# Patient Record
Sex: MALE | Race: WHITE | NOT HISPANIC OR LATINO | Employment: FULL TIME | URBAN - METROPOLITAN AREA
[De-identification: names, ages, dates, MRNs, and addresses within clinical notes are randomized per-mention and may not be internally consistent; named-entity substitution may affect disease eponyms.]

---

## 2017-09-10 ENCOUNTER — HOSPITAL ENCOUNTER (EMERGENCY)
Facility: CLINIC | Age: 52
Discharge: HOME | End: 2017-09-10
Attending: EMERGENCY MEDICINE | Admitting: EMERGENCY MEDICINE
Payer: COMMERCIAL

## 2017-09-10 VITALS — RESPIRATION RATE: 20 BRPM | HEIGHT: 74 IN | TEMPERATURE: 99.4 F | HEART RATE: 86 BPM | OXYGEN SATURATION: 94 %

## 2017-09-10 DIAGNOSIS — R50.9 FEVER, UNSPECIFIED: ICD-10-CM

## 2017-09-10 DIAGNOSIS — L03.116 CELLULITIS OF LEFT LOWER LEG: ICD-10-CM

## 2017-09-10 LAB
BASOPHILS # BLD AUTO: 0 10E9/L (ref 0–0.2)
BASOPHILS NFR BLD AUTO: 0.4 %
DIFFERENTIAL METHOD BLD: ABNORMAL
EOSINOPHIL # BLD AUTO: 0.2 10E9/L (ref 0–0.7)
EOSINOPHIL NFR BLD AUTO: 1.4 %
ERYTHROCYTE [DISTWIDTH] IN BLOOD BY AUTOMATED COUNT: 12.8 % (ref 10–15)
HCT VFR BLD AUTO: 40.1 % (ref 40–53)
HGB BLD-MCNC: 13.7 G/DL (ref 13.3–17.7)
IMM GRANULOCYTES # BLD: 0 10E9/L (ref 0–0.4)
IMM GRANULOCYTES NFR BLD: 0.4 %
LYMPHOCYTES # BLD AUTO: 1.3 10E9/L (ref 0.8–5.3)
LYMPHOCYTES NFR BLD AUTO: 12.2 %
MCH RBC QN AUTO: 28.9 PG (ref 26.5–33)
MCHC RBC AUTO-ENTMCNC: 34.2 G/DL (ref 31.5–36.5)
MCV RBC AUTO: 85 FL (ref 78–100)
MONOCYTES # BLD AUTO: 1 10E9/L (ref 0–1.3)
MONOCYTES NFR BLD AUTO: 8.6 %
NEUTROPHILS # BLD AUTO: 8.5 10E9/L (ref 1.6–8.3)
NEUTROPHILS NFR BLD AUTO: 77 %
NRBC # BLD AUTO: 0 10*3/UL
NRBC BLD AUTO-RTO: 0 /100
PLATELET # BLD AUTO: 204 10E9/L (ref 150–450)
RBC # BLD AUTO: 4.74 10E12/L (ref 4.4–5.9)
WBC # BLD AUTO: 11 10E9/L (ref 4–11)

## 2017-09-10 PROCEDURE — 85025 COMPLETE CBC W/AUTO DIFF WBC: CPT | Performed by: EMERGENCY MEDICINE

## 2017-09-10 PROCEDURE — 25000128 H RX IP 250 OP 636: Performed by: EMERGENCY MEDICINE

## 2017-09-10 PROCEDURE — 96365 THER/PROPH/DIAG IV INF INIT: CPT

## 2017-09-10 PROCEDURE — 99284 EMERGENCY DEPT VISIT MOD MDM: CPT | Mod: 25

## 2017-09-10 PROCEDURE — 87040 BLOOD CULTURE FOR BACTERIA: CPT | Mod: 91 | Performed by: EMERGENCY MEDICINE

## 2017-09-10 RX ORDER — LIDOCAINE 40 MG/G
CREAM TOPICAL
Status: DISCONTINUED | OUTPATIENT
Start: 2017-09-10 | End: 2017-09-10 | Stop reason: HOSPADM

## 2017-09-10 RX ORDER — CEFTRIAXONE 1 G/1
1 INJECTION, POWDER, FOR SOLUTION INTRAMUSCULAR; INTRAVENOUS ONCE
Status: COMPLETED | OUTPATIENT
Start: 2017-09-10 | End: 2017-09-10

## 2017-09-10 RX ADMIN — CEFTRIAXONE SODIUM 1 G: 1 INJECTION, POWDER, FOR SOLUTION INTRAMUSCULAR; INTRAVENOUS at 02:23

## 2017-09-10 ASSESSMENT — ENCOUNTER SYMPTOMS
WOUND: 0
FEVER: 1
COLOR CHANGE: 1

## 2017-09-10 NOTE — ED AVS SNAPSHOT
[facility]    [address]    [facility] [address]    Phone:  [phone]    Fax:  [phone]                                       Triston Rausch   MRN: [id number]    Department:  [facility]   Date of Visit:  9/10/2017           After Visit Summary Signature Page     I have received my discharge instructions, and my questions have been answered. I have discussed any challenges I see with this plan with the nurse or doctor.    ..........................................................................................................................................  Patient/Patient Representative Signature      ..........................................................................................................................................  Patient Representative Print Name and Relationship to Patient    ..................................................               ................................................  Date                                            Time    ..........................................................................................................................................  Reviewed by Signature/Title    ...................................................              ..............................................  Date                                                            Time

## 2017-09-10 NOTE — ED NOTES
Here with redness and swelling of the left lower leg but not the foot for about 24 hours. Fever of 100.3 today. Some nausea . Says he has no known abrasions to that area. Somewhat painful when he walks.

## 2017-09-10 NOTE — ED PROVIDER NOTES
"  History     Chief Complaint:  Leg Erythema      HPI   Triston Rausch is a 52 year old male who presents to the emergency department today for evaluation of left leg redness. The patient reports that he noticed redness and some mild swelling of his left lower leg on Friday, which gradually spread up his leg. He states that he has never had this redness before. He reports a temperature of 100.3 this afternoon and denies any recent cuts or scrapes, although he is noted to have a rash similar to tinea pedis between the toes of his left foot. He denies a known allergy to any antibiotics.    Allergies:  No Known Drug Allergies     Medications:    Diltiazem HCl  Metoprolol    Past Medical History:    Atrial fib/flutter, transient    Past Surgical History:    History reviewed. No pertinent surgical history.    Family History:    History reviewed. No pertinent family history.    Social History:  The patient was accompanied to the ED by his wife.  Marital Status:       Review of Systems   Constitutional: Positive for fever (subjective).   Musculoskeletal:        Positive for mild edema of the left lower leg.   Skin: Positive for color change (erythema of left anterior lower leg) and rash (flaking rash between second and third toe of left foot). Negative for wound.   All other systems reviewed and are negative.    Physical Exam     Vitals:  Patient Vitals for the past 24 hrs:   Temp Temp src Pulse Resp SpO2 Height   09/10/17 0038 99.4  F (37.4  C) Oral 86 20 94 % 1.88 m (6' 2\")     Physical Exam  Nursing note and vitals reviewed.  Constitutional: Cooperative.   HENT:   Mouth/Throat: Mucous membranes are normal.   Cardiovascular: Normal rate, regular rhythm and normal heart sounds.  No murmur.  Pulmonary/Chest: Effort normal and breath sounds normal. No respiratory distress. No wheezes. No rales.    Musculoskeletal: Normal range of motion of LLE, no edema.   Neurological: Alert.   Skin: Skin is warm and dry. Warm, " blanching erythema to the anterior left leg. Weeping rash between the second and third toe consistent with tinea pedis.  Psychiatric: Normal mood and affect.     Emergency Department Course     Laboratory:  Laboratory findings were communicated with the patient who voiced understanding of the findings.    CBC: WBC 11.0, HGB 13.7,     Blood culture x2: pending    Interventions:  0223 Ceftriaxone 1 g IV    Emergency Department Course:  Nursing notes and vitals reviewed.  I performed an exam of the patient as documented above.   IV was inserted and blood was drawn for laboratory testing, results above.  I discussed the treatment plan with the patient. They expressed understanding of this plan and consented to discharge. They will be discharged home with instructions for care and follow up. In addition, the patient will return to the emergency department if their symptoms persist, worsen, if new symptoms arise or if there is any concern.  All questions were answered.  I personally reviewed the laboratory results with the patient and answered all related questions prior to discharge.    Impression & Plan      Medical Decision Making:  Triston Rausch is a 52 year old male who presents for evaluation of erythema of the left lower leg.  The history, physical exam and supporting data are consistent with cellulitis.  There do not appear at this time to be any complication of cellulitis including necrotizing fascitis, lymphangitis, lymphadenitis, abscess, osteomyelitis, sepsis, or shock.  The patient is not immunosuppressed.  Supportive outpatient management is indicated with antibiotics.  Close follow-up of primary care physician to ensure no progression and rapid resolution.  Area of cellulitis was outlined.  Cellulitis precautions for home.      Diagnosis:    ICD-10-CM   1. Cellulitis of left lower leg L03.116   2. Fever R50.9     Disposition:  The patient is discharged to home.  Discharge Medications:  Discharge  Medication List as of 9/10/2017  2:57 AM      START taking these medications    Details   amoxicillin-clavulanate (AUGMENTIN) 875-125 MG per tablet Take 1 tablet by mouth 2 times daily for 7 days, Disp-14 tablet, R-0, Local Print           Scribe Disclosure:  NERY, Farooq Dotson, am serving as a scribe at 1:47 AM on 9/10/2017 to document services personally performed by Eddie Titus MD based on my observations and the provider's statements to me.  [facility] EMERGENCY DEPARTMENT       Eddie Titus MD  09/10/17 0615

## 2017-09-10 NOTE — DISCHARGE INSTRUCTIONS
Discharge Instructions  Cellulitis    Cellulitis is an infection of the skin that occurs when bacteria enter the skin.   Symptoms are generally redness, swelling, warmth and pain.  Your infection appeared to be appropriate to treat at home with antibiotics.  However, sometimes your infection may be worse than it seemed at first, or may worsen with time. If you have new or worse symptoms, you may need to be seen again in the Emergency Department or by your primary provider.    Generally, every Emergency Department visit should have a follow-up clinic visit with either a primary or a specialty clinic/provider. Please follow-up as instructed by your emergency provider today.    Return to the Emergency Department if:    The redness, pain, or swelling gets a lot worse.  If the red area was marked, return if it is red significantly beyond the marked area.    You are unable to get your antibiotics, or are vomiting (throwing up) these pills, or you cannot take them.    You are feeling more ill, weak or lightheaded.    You start to run a new fever (temperature >101 F).    Anything else about the infection worries or concerns you.  Treatment:    Start your antibiotics right away, and take them as prescribed. Be sure to finish the whole prescription, even if you are better.    Apply a heating pad, warm packs, or warm water soaks to the infected area for 15 minutes at a time, at least 3 times a day. Do not use a heating pad on your feet or legs if you have diabetes. Do not sleep with a heating pad on, since this can cause burns or skin injury.    Rest your injured area for at least 1-2 days. After that you may start using your extremity again as long as there is not too much pain.     Raise the injured area above the level of your heart as much as possible in the first 1-2 days.    Tylenol  (acetaminophen), Motrin  (ibuprofen), or Advil  (ibuprofen) may help may help reduce pain and fever and may help you feel more comfortable.  Be sure to read and follow the package directions, and ask your provider if you have questions.    If you were given a prescription for medicine here today, be sure to read all of the information (including the package insert) that comes with your prescription.  This will include important information about the medicine, its side effects, and any warnings that you need to know about.  The pharmacist who fills the prescription can provide more information and answer questions you may have about the medicine.  If you have questions or concerns that the pharmacist cannot address, please call or return to the Emergency Department.     Remember that you can always come back to the Emergency Department if you are not able to see your regular provider in the amount of time listed above, if you get any new symptoms, or if there is anything that worries you.

## 2017-09-10 NOTE — ED AVS SNAPSHOT
[facility] Emergency Department    [address]    [facility] [address]    Phone:  [phone]    Fax:  [phone]                                       Triston Rausch   MRN: [id number]    Department:  [facility] Emergency Department   Date of Visit:  9/10/2017           Patient Information     Date Of Birth          1965        Your diagnoses for this visit were:     Cellulitis of left lower leg     Fever, unspecified        You were seen by Eddie Titus MD.      Follow-up Information     Follow up with Clinic, [facility].    Why:  Monday    Contact information:    [address] [facility] [address]  [phone]          Discharge Instructions       Discharge Instructions  Cellulitis    Cellulitis is an infection of the skin that occurs when bacteria enter the skin.   Symptoms are generally redness, swelling, warmth and pain.  Your infection appeared to be appropriate to treat at home with antibiotics.  However, sometimes your infection may be worse than it seemed at first, or may worsen with time. If you have new or worse symptoms, you may need to be seen again in the Emergency Department or by your primary provider.    Generally, every Emergency Department visit should have a follow-up clinic visit with either a primary or a specialty clinic/provider. Please follow-up as instructed by your emergency provider today.    Return to the Emergency Department if:    The redness, pain, or swelling gets a lot worse.  If the red area was marked, return if it is red significantly beyond the marked area.    You are unable to get your antibiotics, or are vomiting (throwing up) these pills, or you cannot take them.    You are feeling more ill, weak or lightheaded.    You start to run a new fever (temperature >101 F).    Anything else about the infection worries or concerns you.  Treatment:    Start your antibiotics right away, and take them as  prescribed. Be sure to finish the whole prescription, even if you are better.    Apply a heating pad, warm packs, or warm water soaks to the infected area for 15 minutes at a time, at least 3 times a day. Do not use a heating pad on your feet or legs if you have diabetes. Do not sleep with a heating pad on, since this can cause burns or skin injury.    Rest your injured area for at least 1-2 days. After that you may start using your extremity again as long as there is not too much pain.     Raise the injured area above the level of your heart as much as possible in the first 1-2 days.    Tylenol  (acetaminophen), Motrin  (ibuprofen), or Advil  (ibuprofen) may help may help reduce pain and fever and may help you feel more comfortable. Be sure to read and follow the package directions, and ask your provider if you have questions.    If you were given a prescription for medicine here today, be sure to read all of the information (including the package insert) that comes with your prescription.  This will include important information about the medicine, its side effects, and any warnings that you need to know about.  The pharmacist who fills the prescription can provide more information and answer questions you may have about the medicine.  If you have questions or concerns that the pharmacist cannot address, please call or return to the Emergency Department.     Remember that you can always come back to the Emergency Department if you are not able to see your regular provider in the amount of time listed above, if you get any new symptoms, or if there is anything that worries you.      24 Hour Appointment Hotline       To make an appointment at any [facility], call [phone] ([phone]). If you don't have a family doctor or clinic, we will help you find one. [place] clinics are conveniently located to serve the needs of you and your family.             Review of your medicines      START taking         Dose / Directions Last dose taken    amoxicillin-clavulanate 875-125 MG per tablet   Commonly known as:  AUGMENTIN   Dose:  1 tablet   Quantity:  14 tablet        Take 1 tablet by mouth 2 times daily for 7 days   Refills:  0          Our records show that you are taking the medicines listed below. If these are incorrect, please call your family doctor or clinic.        Dose / Directions Last dose taken    DILTIAZEM HCL PO        Refills:  0        metoprolol 25 MG 24 hr tablet   Commonly known as:  TOPROL-XL   Dose:  25 mg   Quantity:  90 tablet        Take 1 tablet by mouth daily.   Refills:  3                Prescriptions were sent or printed at these locations (1 Prescription)                   Other Prescriptions                Printed at Department/Unit printer (1 of 1)         amoxicillin-clavulanate (AUGMENTIN) 875-125 MG per tablet                Procedures and tests performed during your visit     Procedure/Test Number of Times Performed    Blood culture 2    CBC with platelets differential 1      Orders Needing Specimen Collection     None      Pending Results     No orders found from 9/8/2017 to 9/11/2017.            Pending Culture Results     No orders found from 9/8/2017 to 9/11/2017.            Pending Results Instructions     If you had any lab results that were not finalized at the time of your Discharge, you can call the ED Lab Result RN at [phone]. You will be contacted by this team for any positive Lab results or changes in treatment. The nurses are available 7 days a week from 10A to 6:30P.  You can leave a message 24 hours per day and they will return your call.        Test Results From Your Hospital Stay        9/10/2017  2:31 AM      Component Results     Component Value Ref Range & Units Status    WBC 11.0 4.0 - 11.0 10e9/L Final    RBC Count 4.74 4.4 - 5.9 10e12/L Final    Hemoglobin 13.7 13.3 - 17.7 g/dL Final    Hematocrit 40.1 40.0 - 53.0 % Final    MCV 85 78 - 100 fl Final    MCH  28.9 26.5 - 33.0 pg Final    MCHC 34.2 31.5 - 36.5 g/dL Final    RDW 12.8 10.0 - 15.0 % Final    Platelet Count 204 150 - 450 10e9/L Final    Diff Method Automated Method  Final    % Neutrophils 77.0 % Final    % Lymphocytes 12.2 % Final    % Monocytes 8.6 % Final    % Eosinophils 1.4 % Final    % Basophils 0.4 % Final    % Immature Granulocytes 0.4 % Final    Nucleated RBCs 0 0 /100 Final    Absolute Neutrophil 8.5 (H) 1.6 - 8.3 10e9/L Final    Absolute Lymphocytes 1.3 0.8 - 5.3 10e9/L Final    Absolute Monocytes 1.0 0.0 - 1.3 10e9/L Final    Absolute Eosinophils 0.2 0.0 - 0.7 10e9/L Final    Absolute Basophils 0.0 0.0 - 0.2 10e9/L Final    Abs Immature Granulocytes 0.0 0 - 0.4 10e9/L Final    Absolute Nucleated RBC 0.0  Final                Clinical Quality Measure: Blood Pressure Screening     Your blood pressure was checked while you were in the emergency department today. The last reading we obtained was    . Please read the guidelines below about what these numbers mean and what you should do about them.  If your systolic blood pressure (the top number) is less than 120 and your diastolic blood pressure (the bottom number) is less than 80, then your blood pressure is normal. There is nothing more that you need to do about it.  If your systolic blood pressure (the top number) is 120-139 or your diastolic blood pressure (the bottom number) is 80-89, your blood pressure may be higher than it should be. You should have your blood pressure rechecked within a year by a primary care provider.  If your systolic blood pressure (the top number) is 140 or greater or your diastolic blood pressure (the bottom number) is 90 or greater, you may have high blood pressure. High blood pressure is treatable, but if left untreated over time it can put you at risk for heart attack, stroke, or kidney failure. You should have your blood pressure rechecked by a primary care provider within the next 4 weeks.  If your provider in the  "emergency department today gave you specific instructions to follow-up with your doctor or provider even sooner than that, you should follow that instruction and not wait for up to 4 weeks for your follow-up visit.        Thank you for choosing [facility]       Thank you for choosing [facility] for your care. Our goal is always to provide you with excellent care. Hearing back from our patients is one way we can continue to improve our services. Please take a few minutes to complete the written survey that you may receive in the mail after you visit with us. Thank you!        [email or web address] Information     [employer] lets you send messages to your doctor, view your test results, renew your prescriptions, schedule appointments and more. To sign up, go to [email or web address] . Click on \"Log in\" on the left side of the screen, which will take you to the Welcome page. Then click on \"Sign up Now\" on the right side of the page.     You will be asked to enter the access code listed below, as well as some personal information. Please follow the directions to create your username and password.     Your access code is: [email or web address]  Expires: 2017  2:41 AM     Your access code will  in 90 days. If you need help or a new code, please call your [facility] clinic or [phone].        Care EveryWhere ID     This is your Care EveryWhere ID. This could be used by other organizations to access your [facility] medical records  [id number]        Equal Access to Services     LUIS ANTONIO PHILIP : Hadii mike Mosquera, waaxda lunandiniadaha, qaybta kaalmada cora, mateusz garces . So [facility] [phone].    ATENCIÓN: Si habla español, tiene a muñiz disposición servicios gratuitos de asistencia lingüística. Llame al [phone].    We comply with applicable federal civil rights laws and [place] laws. We do not discriminate on the basis of race, color, national origin, age, disability sex, sexual orientation or " gender identity.            After Visit Summary       This is your record. Keep this with you and show to your community pharmacist(s) and doctor(s) at your next visit.

## 2017-09-16 LAB
BACTERIA SPEC CULT: NO GROWTH
BACTERIA SPEC CULT: NO GROWTH
Lab: NORMAL
Lab: NORMAL
SPECIMEN SOURCE: NORMAL
SPECIMEN SOURCE: NORMAL

## 2018-11-24 ENCOUNTER — HOSPITAL ENCOUNTER (EMERGENCY)
Facility: CLINIC | Age: 53
Discharge: HOME | End: 2018-11-24
Attending: EMERGENCY MEDICINE | Admitting: EMERGENCY MEDICINE
Payer: COMMERCIAL

## 2018-11-24 VITALS
HEIGHT: 75 IN | DIASTOLIC BLOOD PRESSURE: 66 MMHG | RESPIRATION RATE: 22 BRPM | BODY MASS INDEX: 39.17 KG/M2 | SYSTOLIC BLOOD PRESSURE: 124 MMHG | OXYGEN SATURATION: 94 % | TEMPERATURE: 98 F | WEIGHT: 315 LBS

## 2018-11-24 DIAGNOSIS — I48.91 ATRIAL FIBRILLATION STATUS POST CARDIOVERSION (H): ICD-10-CM

## 2018-11-24 DIAGNOSIS — Z01.89 ENCOUNTER FOR CARDIOVERSION PROCEDURE: ICD-10-CM

## 2018-11-24 DIAGNOSIS — I48.0 PAROXYSMAL ATRIAL FIBRILLATION (H): ICD-10-CM

## 2018-11-24 LAB
ANION GAP SERPL CALCULATED.3IONS-SCNC: 8 MMOL/L (ref 3–14)
BASOPHILS # BLD AUTO: 0 10E9/L (ref 0–0.2)
BASOPHILS NFR BLD AUTO: 0.6 %
BUN SERPL-MCNC: 17 MG/DL (ref 7–30)
CALCIUM SERPL-MCNC: 8.6 MG/DL (ref 8.5–10.1)
CHLORIDE SERPL-SCNC: 106 MMOL/L (ref 94–109)
CO2 SERPL-SCNC: 26 MMOL/L (ref 20–32)
CREAT SERPL-MCNC: 0.84 MG/DL (ref 0.66–1.25)
DIFFERENTIAL METHOD BLD: NORMAL
EOSINOPHIL # BLD AUTO: 0.3 10E9/L (ref 0–0.7)
EOSINOPHIL NFR BLD AUTO: 4.1 %
ERYTHROCYTE [DISTWIDTH] IN BLOOD BY AUTOMATED COUNT: 12.5 % (ref 10–15)
GFR SERPL CREATININE-BSD FRML MDRD: >90 ML/MIN/1.7M2
GLUCOSE SERPL-MCNC: 104 MG/DL (ref 70–99)
HCT VFR BLD AUTO: 46.4 % (ref 40–53)
HGB BLD-MCNC: 15.7 G/DL (ref 13.3–17.7)
IMM GRANULOCYTES # BLD: 0 10E9/L (ref 0–0.4)
IMM GRANULOCYTES NFR BLD: 0.3 %
LYMPHOCYTES # BLD AUTO: 1.7 10E9/L (ref 0.8–5.3)
LYMPHOCYTES NFR BLD AUTO: 24.8 %
MAGNESIUM SERPL-MCNC: 2.2 MG/DL (ref 1.6–2.3)
MCH RBC QN AUTO: 29.2 PG (ref 26.5–33)
MCHC RBC AUTO-ENTMCNC: 33.8 G/DL (ref 31.5–36.5)
MCV RBC AUTO: 86 FL (ref 78–100)
MONOCYTES # BLD AUTO: 0.6 10E9/L (ref 0–1.3)
MONOCYTES NFR BLD AUTO: 8.3 %
NEUTROPHILS # BLD AUTO: 4.3 10E9/L (ref 1.6–8.3)
NEUTROPHILS NFR BLD AUTO: 61.9 %
NRBC # BLD AUTO: 0 10*3/UL
NRBC BLD AUTO-RTO: 0 /100
NT-PROBNP SERPL-MCNC: 667 PG/ML (ref 0–900)
PLATELET # BLD AUTO: 230 10E9/L (ref 150–450)
POTASSIUM SERPL-SCNC: 3.8 MMOL/L (ref 3.4–5.3)
RBC # BLD AUTO: 5.38 10E12/L (ref 4.4–5.9)
SODIUM SERPL-SCNC: 140 MMOL/L (ref 133–144)
T4 FREE SERPL-MCNC: 1.11 NG/DL (ref 0.76–1.46)
TROPONIN I SERPL-MCNC: <0.015 UG/L (ref 0–0.04)
TSH SERPL DL<=0.005 MIU/L-ACNC: 7.31 MU/L (ref 0.4–4)
WBC # BLD AUTO: 6.9 10E9/L (ref 4–11)

## 2018-11-24 PROCEDURE — 27211117 ZZH CARDIOVERT/DEFIB/PACER SUPP

## 2018-11-24 PROCEDURE — 96361 HYDRATE IV INFUSION ADD-ON: CPT

## 2018-11-24 PROCEDURE — 99285 EMERGENCY DEPT VISIT HI MDM: CPT | Mod: 25

## 2018-11-24 PROCEDURE — 83735 ASSAY OF MAGNESIUM: CPT | Performed by: EMERGENCY MEDICINE

## 2018-11-24 PROCEDURE — 92960 CARDIOVERSION ELECTRIC EXT: CPT

## 2018-11-24 PROCEDURE — 85025 COMPLETE CBC W/AUTO DIFF WBC: CPT | Performed by: EMERGENCY MEDICINE

## 2018-11-24 PROCEDURE — 25000128 H RX IP 250 OP 636: Performed by: EMERGENCY MEDICINE

## 2018-11-24 PROCEDURE — 80048 BASIC METABOLIC PNL TOTAL CA: CPT | Performed by: EMERGENCY MEDICINE

## 2018-11-24 PROCEDURE — 84439 ASSAY OF FREE THYROXINE: CPT | Performed by: EMERGENCY MEDICINE

## 2018-11-24 PROCEDURE — 96360 HYDRATION IV INFUSION INIT: CPT

## 2018-11-24 PROCEDURE — 84484 ASSAY OF TROPONIN QUANT: CPT | Performed by: EMERGENCY MEDICINE

## 2018-11-24 PROCEDURE — 96374 THER/PROPH/DIAG INJ IV PUSH: CPT

## 2018-11-24 PROCEDURE — 84443 ASSAY THYROID STIM HORMONE: CPT | Performed by: EMERGENCY MEDICINE

## 2018-11-24 PROCEDURE — 93005 ELECTROCARDIOGRAM TRACING: CPT

## 2018-11-24 PROCEDURE — 83880 ASSAY OF NATRIURETIC PEPTIDE: CPT | Performed by: EMERGENCY MEDICINE

## 2018-11-24 RX ORDER — SODIUM CHLORIDE 9 MG/ML
INJECTION, SOLUTION INTRAVENOUS CONTINUOUS
Status: DISCONTINUED | OUTPATIENT
Start: 2018-11-24 | End: 2018-11-24 | Stop reason: HOSPADM

## 2018-11-24 RX ORDER — PROPOFOL 10 MG/ML
1 INJECTION, EMULSION INTRAVENOUS ONCE
Status: COMPLETED | OUTPATIENT
Start: 2018-11-24 | End: 2018-11-24

## 2018-11-24 RX ADMIN — PROPOFOL 180 MG: 10 INJECTION, EMULSION INTRAVENOUS at 11:24

## 2018-11-24 RX ADMIN — SODIUM CHLORIDE: 9 INJECTION, SOLUTION INTRAVENOUS at 09:51

## 2018-11-24 ASSESSMENT — ENCOUNTER SYMPTOMS
VOMITING: 0
PALPITATIONS: 1
LIGHT-HEADEDNESS: 0
ABDOMINAL PAIN: 0
HEADACHES: 0
COUGH: 0
CHILLS: 0
FATIGUE: 1
FEVER: 0
SHORTNESS OF BREATH: 1

## 2018-11-24 NOTE — ED AVS SNAPSHOT
[facility] Emergency Department    [address]    [facility] [address]    Phone:  [phone]    Fax:  [phone]                                       Triston Rausch   MRN: [id number]    Department:  [facility] Emergency Department   Date of Visit:  11/24/2018           Patient Information     Date Of Birth          1965        Your diagnoses for this visit were:     Paroxysmal atrial fibrillation (H)     Atrial fibrillation status post cardioversion (H)     Encounter for cardioversion procedure        You were seen by Ela Garrido MD.      Follow-up Information     Follow up with [facility], [facility] [place] In 1 week.    Contact information:    [address] [facility] [address]  [phone]          Follow up with Cardiology. Schedule an appointment as soon as possible for a visit in 2 days.        Follow up with [facility] Emergency Department.    Specialty:  EMERGENCY MEDICINE    Why:  If symptoms worsen    Contact information:    [address] [facility] [address] [phone]        Discharge Instructions       Call cardiology first thing Monday.  Return immediately with return of symptoms or any new concerns.    Discharge References/Attachments     RECOVERY AFTER CONSCIOUS SEDATION (ADULT) (ENGLISH)    ATRIAL FIBRILLATION, DISCHARGE INSTRUCTIONS FOR (ENGLISH)      24 Hour Appointment Hotline       To make an appointment at any [place] clinic, call [phone] ([phone]). If you don't have a family doctor or clinic, we will help you find one. [place] clinics are conveniently located to serve the needs of you and your family.             Review of your medicines      Our records show that you are taking the medicines listed below. If these are incorrect, please call your family doctor or clinic.        Dose / Directions Last dose taken    DILTIAZEM HCL PO   Dose:  360 mg        Take 360 mg by mouth    Refills:  0        metoprolol succinate 25 MG 24 hr tablet   Commonly known as:  TOPROL-XL   Dose:  25 mg   Quantity:  90 tablet        Take 1 tablet by mouth daily.   Refills:  3                Procedures and tests performed during your visit     Procedure/Test Number of Times Performed    Basic metabolic panel 1    CBC with platelets differential 1    Cardiac Continuous Monitoring 1    EKG 12 lead 2    Magnesium 1    Nt probnp inpatient (BNP) 1    Peripheral IV catheter 1    Pulse oximetry nursing 1    T4 free 2    TSH with free T4 reflex 1    Troponin I 1      Orders Needing Specimen Collection     None      Pending Results     Date and Time Order Name Status Description    11/24/2018 1126 EKG 12 lead Preliminary     11/24/2018 0946 EKG 12 lead Preliminary     11/24/2018 0942 T4 free In process             Pending Culture Results     No orders found from 11/22/2018 to 11/25/2018.            Pending Results Instructions     If you had any lab results that were not finalized at the time of your Discharge, you can call the ED Lab Result RN at [phone]. You will be contacted by this team for any positive Lab results or changes in treatment. The nurses are available 7 days a week from 10A to 6:30P.  You can leave a message 24 hours per day and they will return your call.        Test Results From Your Hospital Stay        11/24/2018 10:12 AM      Component Results     Component Value Ref Range & Units Status    N-Terminal Pro BNP Inpatient 667 0 - 900 pg/mL Final       Reference range shown and results flagged as abnormal are suggested inpatient   cut points for confirming diagnosis if CHF in an acute setting. Establishing a   baseline value for each individual patient is useful for follow-up. An   inpatient or emergency department NT-proPBNP <300 pg/mL effectively rules out   acute CHF, with 99% negative predictive value.  The outpatient non-acute reference range for ruling out CHF is:   0-125 pg/mL (age 18 to  less than 75)   0-450 pg/mL (age 75 yrs and older)           11/24/2018 10:30 AM      Component Results     Component Value Ref Range & Units Status    TSH 7.31 (H) 0.40 - 4.00 mU/L Final         11/24/2018 10:12 AM      Component Results     Component Value Ref Range & Units Status    Troponin I ES <0.015 0.000 - 0.045 ug/L Final    The 99th percentile for upper reference range is 0.045 ug/L.  Troponin values   in the range of 0.045 - 0.120 ug/L may be associated with risks of adverse   clinical events.           11/24/2018 10:12 AM      Component Results     Component Value Ref Range & Units Status    Sodium 140 133 - 144 mmol/L Final    Potassium 3.8 3.4 - 5.3 mmol/L Final    Chloride 106 94 - 109 mmol/L Final    Carbon Dioxide 26 20 - 32 mmol/L Final    Anion Gap 8 3 - 14 mmol/L Final    Glucose 104 (H) 70 - 99 mg/dL Final    Urea Nitrogen 17 7 - 30 mg/dL Final    Creatinine 0.84 0.66 - 1.25 mg/dL Final    GFR Estimate >90 >60 mL/min/1.7m2 Final    Non  GFR Calc    GFR Estimate If Black >90 >60 mL/min/1.7m2 Final    African American GFR Calc    Calcium 8.6 8.5 - 10.1 mg/dL Final         11/24/2018  9:53 AM      Component Results     Component Value Ref Range & Units Status    WBC 6.9 4.0 - 11.0 10e9/L Final    RBC Count 5.38 4.4 - 5.9 10e12/L Final    Hemoglobin 15.7 13.3 - 17.7 g/dL Final    Hematocrit 46.4 40.0 - 53.0 % Final    MCV 86 78 - 100 fl Final    MCH 29.2 26.5 - 33.0 pg Final    MCHC 33.8 31.5 - 36.5 g/dL Final    RDW 12.5 10.0 - 15.0 % Final    Platelet Count 230 150 - 450 10e9/L Final    Diff Method Automated Method  Final    % Neutrophils 61.9 % Final    % Lymphocytes 24.8 % Final    % Monocytes 8.3 % Final    % Eosinophils 4.1 % Final    % Basophils 0.6 % Final    % Immature Granulocytes 0.3 % Final    Nucleated RBCs 0 0 /100 Final    Absolute Neutrophil 4.3 1.6 - 8.3 10e9/L Final    Absolute Lymphocytes 1.7 0.8 - 5.3 10e9/L Final    Absolute Monocytes 0.6 0.0 - 1.3 10e9/L Final     Absolute Eosinophils 0.3 0.0 - 0.7 10e9/L Final    Absolute Basophils 0.0 0.0 - 0.2 10e9/L Final    Abs Immature Granulocytes 0.0 0 - 0.4 10e9/L Final    Absolute Nucleated RBC 0.0  Final         11/24/2018 10:12 AM      Component Results     Component Value Ref Range & Units Status    Magnesium 2.2 1.6 - 2.3 mg/dL Final         11/24/2018 10:17 AM         11/24/2018 10:30 AM      Component Results     Component Value Ref Range & Units Status    T4 Free 1.11 0.76 - 1.46 ng/dL Final                Clinical Quality Measure: Blood Pressure Screening     Your blood pressure was checked while you were in the emergency department today. The last reading we obtained was  BP: 124/66 . Please read the guidelines below about what these numbers mean and what you should do about them.  If your systolic blood pressure (the top number) is less than 120 and your diastolic blood pressure (the bottom number) is less than 80, then your blood pressure is normal. There is nothing more that you need to do about it.  If your systolic blood pressure (the top number) is 120-139 or your diastolic blood pressure (the bottom number) is 80-89, your blood pressure may be higher than it should be. You should have your blood pressure rechecked within a year by a primary care provider.  If your systolic blood pressure (the top number) is 140 or greater or your diastolic blood pressure (the bottom number) is 90 or greater, you may have high blood pressure. High blood pressure is treatable, but if left untreated over time it can put you at risk for heart attack, stroke, or kidney failure. You should have your blood pressure rechecked by a primary care provider within the next 4 weeks.  If your provider in the emergency department today gave you specific instructions to follow-up with your doctor or provider even sooner than that, you should follow that instruction and not wait for up to 4 weeks for your follow-up visit.        Thank you for  "choosing [facility]       Thank you for choosing [facility] for your care. Our goal is always to provide you with excellent care. Hearing back from our patients is one way we can continue to improve our services. Please take a few minutes to complete the written survey that you may receive in the mail after you visit with us. Thank you!        [employer] Information     [employer] lets you send messages to your doctor, view your test results, renew your prescriptions, schedule appointments and more. To sign up, go to [email or web address] . Click on \"Log in\" on the left side of the screen, which will take you to the Welcome page. Then click on \"Sign up Now\" on the right side of the page.     You will be asked to enter the access code listed below, as well as some personal information. Please follow the directions to create your username and password.     Your access code is: [email or web address]  Expires: 2019 10:23 AM     Your access code will  in 90 days. If you need help or a new code, please call your [facility] clinic or [phone].        Care EveryWhere ID     This is your Care EveryWhere ID. This could be used by other organizations to access your [facility] medical records  [id number]        Equal Access to Services     LUIS ANTONIO PHILIP : Abigail gomeso Demetri, wacarineda lunandiniadaha, qaybta kaalmada adesonia, mateusz davis. So [facility] [phone].    ATENCIÓN: Si habla español, tiene a muñiz disposición servicios gratuitos de asistencia lingüística. Llame al [phone].    We comply with applicable federal civil rights laws and [place] laws. We do not discriminate on the basis of race, color, national origin, age, disability, sex, sexual orientation, or gender identity.            After Visit Summary       This is your record. Keep this with you and show to your community pharmacist(s) and doctor(s) at your next visit.                  "

## 2018-11-24 NOTE — ED PROVIDER NOTES
"  History     Chief Complaint:  Afib     The history is provided by the patient.      Triston Rausch is a 53 year old male with history of atrial fibrillation on Cardizem who presents with concern for atrial fibrillation. The patient reports that around midnight last night (9.5 hours prior to arrival), he felt like he \"flipped into atrial fibrillation\" with sudden onset of palpitations, nausea, fatigue, and shortness of breath similar to his past episodes of atrial fibrillation. In the past, patient has had multiple electrical cardioversions. He had an ablation last year and has had only 1 episode of atrial fibrillation until today since that time.  The patient denies chest pain or abdominal pain. He denies recent illness. He reports he may have missed 1 dose of his diltiazem recently, which is unusual for him, but he has had a \"very rough week\" at work. His wife remarks that patient's atrial fibrillation episodes typically occur at times of high stress (e.g. twice on the day of a ).     Allergies:  No known drug allergies      Medications:    Diltiazem    Past Medical History:    Atrial fibrillation s/p ablation  Kidney stones     Past Surgical History:    History reviewed. No pertinent surgical history.     Family History:    History reviewed. No pertinent family history.      Social History:  Presents with wife  Marital Status:    Smoking status: never  Alcohol use: No      Review of Systems   Constitutional: Positive for fatigue. Negative for chills and fever.   Respiratory: Positive for shortness of breath. Negative for cough.    Cardiovascular: Positive for palpitations. Negative for chest pain and leg swelling.   Gastrointestinal: Negative for abdominal pain and vomiting.   Neurological: Negative for light-headedness and headaches.   All other systems reviewed and are negative.       Physical Exam     Patient Vitals for the past 24 hrs:   BP Temp Temp src Heart Rate Resp SpO2 Height Weight " "  11/24/18 1145 124/66 - - 73 - 94 % - -   11/24/18 1108 - - - - 22 97 % - -   11/24/18 1045 128/86 - - 89 - - - -   11/24/18 1030 (!) 133/99 - - 81 - 96 % - -   11/24/18 0939 - 98  F (36.7  C) Oral - - - - -   11/24/18 0935 (!) 139/96 - - 84 - 96 % 1.905 m (6' 3\") (!) 158.8 kg (350 lb)      Physical Exam  General: Well-developed and well-nourished. Well appearing middle aged  man. Cooperative.  Head:  Atraumatic.  Eyes:  Conjunctivae, lids, and sclerae are normal.  ENT:    Normal nose. Moist mucous membranes.  Neck:  Supple. Normal range of motion.  CV:  Regular rate and irregularly irregular rhythm. Normal heart sounds with no murmurs, rubs, or gallops detected.  Resp:  No respiratory distress. Clear to auscultation bilaterally without decreased breath sounds, wheezing, rales, or rhonchi.  GI:  Soft. Non-distended. Non-tender.    MS:  Normal ROM. No bilateral lower extremity edema.  Skin:  Warm. Non-diaphoretic. No pallor.  Neuro: Awake. A&Ox3. Normal strength.  Psych:  Normal mood and affect. Normal speech.  Vitals reviewed.    Emergency Department Course   #1 EKG  Time: 0931  Rate 93 bpm. QRS duration 104. QT/QTc 362/450.   Atrial fibrillation  Incomplete right bundle branch block   No acute ST changes.  No change as compared to prior, dated 11/6/12.    #2 EKG  Time: 1119  Rate 78 bpm. SD interval 192. QRS duration 106. QT/QTc 362/450.   Normal sinus rhythm   Incomplete right bundle branch block  Borderline ECG  No acute ST changes.   Sinus rhythm replaces atrial fibrillation compared to prior dated today, 11/24/2018.    Laboratory:  BNP: 667    TSH: 7.31(H)    Troponin I: <0.015    BMP: glucose 104(H), o/w WNL (creatinine 0.84)    CBC: WNL (WBC 6.9, HGB 15.7, )     Magnesium: 2.2    (1030) T4 free: 1.11  T4 free: In process      Procedures:  [facility] Procedure Note        Sedation:      Performed by: Ela Garrido MD  Authorized by: Ela Garrido MD    Pre-Procedure Assessment " done at 1100.    Expected Level:  Moderate Sedation    Indication:  Sedation is required to allow for Cardioversion    Consent obtained from patient after discussing the risks, benefits and alternatives.    PO Intake:  Appropriately NPO for procedure    ASA Class:  Class 2 - MILD SYSTEMIC DISEASE, NO ACUTE PROBLEMS, NO FUNCTIONAL LIMITATIONS.    Mallampati:  Grade 1:  Soft palate, uvula, tonsillar pillars, and posterior pharyngeal wall visible    Lungs: Lungs Clear with good breath sounds bilaterally.     Heart: Normal heart sounds and rate    History and physical reviewed and no updates needed. I have reviewed the lab findings, diagnostic data, medications, and the plan for sedation. I have determined this patient to be an appropriate candidate for the planned sedation and procedure and have reassessed the patient IMMEDIATELY PRIOR to sedation and procedure.    Electrical Cardioversion:    Indications: symptomatic atrial fibrillation    Consent:  Risks, benefits and alternatives were discussed with the patient consent for procedure was obtained.    Timeout:  Universal protocol was followed. TIME OUT conducted just prior to starting procedure confirmed patient identity, site/side, procedure, patient position, and availability of correct equipment and implants.? Yes    Medication: Propofol     Procedure note:  Quick combo electrodes were placed in anterior-posterior positions  Trial 1: Synchronized shock at 200 Joules was successful.     Patient Status:   Patient tolerated the procedure well. There were no complications.      Sedation Post Procedure Summary:    Prior to the start of the procedure and with procedural staff participation, I verbally confirmed the patient s identity using two indicators, relevant allergies, that the procedure was appropriate and matched the consent or emergent situation, and that the correct equipment/implants were available. Immediately prior to starting the procedure I conducted the  "Time Out with the procedural staff and re-confirmed the patient s name, procedure, and site/side. (The Joint Commission universal protocol was followed.)  Yes      Sedatives: Propofol    Vital signs, airway, End Tidal CO2 and pulse oximetry were monitored and remained stable throughout the procedure and sedation was maintained until the procedure was complete.  The patient was monitored by staff until sedation discharge criteria were met.    Patient tolerance: Patient tolerated the procedure well with no immediate complications.    Time of sedation in minutes:  10 minutes from beginning to end of physician one to one monitoring.      Interventions:   1124: Diprivan 180 mg, IV      Emergency Department Course:  Past medical records, nursing notes, and vitals reviewed.  0938: I performed an exam of the patient and obtained history, as documented above.  IV inserted and blood drawn.     Cardioversion performed as above.    1221: I rechecked the patient. Explained findings to the patient.    I rechecked the patient. Findings and plan explained to the Patient. Patient discharged home with instructions regarding supportive care, medications, and reasons to return. The importance of close follow-up was reviewed.         VPIUM-1-Jekg Score  (calculator)  Background  Calculates overall risk of atrial fibrillation related CVA based on 7 factors: Age>=65-75, CHF, Htn, CVA/TIA, DM, vascular disease, female  Data  53 year old year old male   Atrial fibrillation  Criteria   Of possible 6 points for 5 possible items  NEGATIVE    Interpretation  MFOIK-9-Egmd Score: 0  CHADS Score 0: Aspirin 81 to 325 mg daily    Impression & Plan      Medical Decision Making:  Triston is a 53-year-old man who has a history of atrial fibrillation with history of ablation and multiple cardioversions in the past.  He presents as he felt himself \"flip in\" to atrial fibrillation with symptoms of fatigue, shortness of breath, palpitations without chest " pain about 10 hours ago.  This is exactly the same as his other episodes of atrial fibrillation and he presents requesting cardioversion.  EKG confirms atrial fibrillation in a rate controlled manner.  His workup is unremarkable including normal BNP and no evidence of volume overload on exam.  He has a negative troponin and normal electrolytes including magnesium. There is no evidence of thyroid dysfunction with a normal free T4 and no anemia or leukocytosis.  Thus, given patient's history of atrial fibrillation with known time of onset of his symptoms much < 48 hours, as well as his request for cardioversion, patient was consented for electrical cardioversion.  After we discussed risks and benefits he elected to proceed.  He underwent electrical cardioversion as above with propofol for sedation which he tolerated quite well.  We successfully cardioverted to normal sinus rhythm as confirmed by repeat EKG.  Patient recovered from sedation and was able to tolerate PO and ambulate without difficulty.  He states he is feeling quite well with no persistent symptoms that initially prompted his evaluation and felt comfortable with plan for discharge. At this time, I do not believe patient requires anticoagulation given his atrial fibrillation is quite paroxysmal and he is symptomatic with that so he will be able to come in should he have a recurrence. SGHKD-1-Pyob 0. However, I recommended he call his cardiologist first thing on Monday to discuss his recurrent atrial fibrillation and visit to the emergency department requiring cardioversion.  I have provided strict return precautions and answered all the patient's questions.  He verbalized understanding is amenable to discharge.    Diagnosis:    ICD-10-CM   1. Paroxysmal atrial fibrillation (H) I48.0   2. Atrial fibrillation status post cardioversion (H) I48.91   3. Encounter for cardioversion procedure Z01.89       Disposition:  discharged home    Discharge Medications:  No new discharge medications    Megan Beh  11/24/2018   [facility] EMERGENCY DEPARTMENT  I, Megan Beh, am serving as a scribe at 9:38 AM on 11/24/2018 to document services personally performed by Ela Garrido MD based on my observations and the provider's statements to me.       Ela Garrido MD  11/28/18 6380

## 2018-11-24 NOTE — DISCHARGE INSTRUCTIONS
Call cardiology first thing Monday.  Return immediately with return of symptoms or any new concerns.

## 2018-11-24 NOTE — ED AVS SNAPSHOT
[facility] Emergency Department    [address]    [facility] [address]    Phone:  [phone]    Fax:  [phone]                                       Triston Rausch   MRN: [id number]    Department:  [facility] Emergency Department   Date of Visit:  11/24/2018           After Visit Summary Signature Page     I have received my discharge instructions, and my questions have been answered. I have discussed any challenges I see with this plan with the nurse or doctor.    ..........................................................................................................................................  Patient/Patient Representative Signature      ..........................................................................................................................................  Patient Representative Print Name and Relationship to Patient    ..................................................               ................................................  Date                                   Time    ..........................................................................................................................................  Reviewed by Signature/Title    ...................................................              ..............................................  Date                                               Time          22EPIC Rev 08/18

## 2018-11-24 NOTE — ED NOTES
Pt ambulated in hallway independently, denies dizziness N/V or lightheadedness. Pt able to tolerate PO fluids.

## 2018-11-24 NOTE — ED TRIAGE NOTES
Pt reports around midnight feels like he flipped into A. Fib, feels like he had heart palpitations, SOB, nausea. Last time being cardioverted for A. Fib was this past summer. Had cardiac ablation October 2017. Pt A&Ox4.

## 2018-11-25 LAB
INTERPRETATION ECG - MUSE: NORMAL
INTERPRETATION ECG - MUSE: NORMAL

## 2019-06-11 ENCOUNTER — HOSPITAL ENCOUNTER (INPATIENT)
Facility: CLINIC | Age: 54
LOS: 5 days | Discharge: HOME | End: 2019-06-16
Attending: EMERGENCY MEDICINE | Admitting: INTERNAL MEDICINE
Payer: COMMERCIAL

## 2019-06-11 ENCOUNTER — APPOINTMENT (OUTPATIENT)
Dept: CT IMAGING | Facility: CLINIC | Age: 54
End: 2019-06-11
Attending: EMERGENCY MEDICINE
Payer: COMMERCIAL

## 2019-06-11 DIAGNOSIS — R11.2 NAUSEA VOMITING AND DIARRHEA: ICD-10-CM

## 2019-06-11 DIAGNOSIS — K56.609 SBO (SMALL BOWEL OBSTRUCTION) (H): ICD-10-CM

## 2019-06-11 DIAGNOSIS — R19.7 NAUSEA VOMITING AND DIARRHEA: ICD-10-CM

## 2019-06-11 LAB
ALBUMIN SERPL-MCNC: 3.8 G/DL (ref 3.4–5)
ALP SERPL-CCNC: 130 U/L (ref 40–150)
ALT SERPL W P-5'-P-CCNC: 34 U/L (ref 0–70)
ANION GAP SERPL CALCULATED.3IONS-SCNC: 6 MMOL/L (ref 3–14)
AST SERPL W P-5'-P-CCNC: 25 U/L (ref 0–45)
BASOPHILS # BLD AUTO: 0 10E9/L (ref 0–0.2)
BASOPHILS NFR BLD AUTO: 0.2 %
BILIRUB SERPL-MCNC: 0.7 MG/DL (ref 0.2–1.3)
BUN SERPL-MCNC: 18 MG/DL (ref 7–30)
CALCIUM SERPL-MCNC: 8.2 MG/DL (ref 8.5–10.1)
CHLORIDE SERPL-SCNC: 103 MMOL/L (ref 94–109)
CO2 SERPL-SCNC: 26 MMOL/L (ref 20–32)
CREAT SERPL-MCNC: 1.12 MG/DL (ref 0.66–1.25)
DIFFERENTIAL METHOD BLD: NORMAL
EOSINOPHIL # BLD AUTO: 0.1 10E9/L (ref 0–0.7)
EOSINOPHIL NFR BLD AUTO: 1.4 %
ERYTHROCYTE [DISTWIDTH] IN BLOOD BY AUTOMATED COUNT: 12.9 % (ref 10–15)
GFR SERPL CREATININE-BSD FRML MDRD: 75 ML/MIN/{1.73_M2}
GLUCOSE SERPL-MCNC: 115 MG/DL (ref 70–99)
HCT VFR BLD AUTO: 46.3 % (ref 40–53)
HGB BLD-MCNC: 15.4 G/DL (ref 13.3–17.7)
IMM GRANULOCYTES # BLD: 0 10E9/L (ref 0–0.4)
IMM GRANULOCYTES NFR BLD: 0.2 %
LACTATE BLD-SCNC: 1.2 MMOL/L (ref 0.7–2)
LIPASE SERPL-CCNC: 252 U/L (ref 73–393)
LYMPHOCYTES # BLD AUTO: 1.2 10E9/L (ref 0.8–5.3)
LYMPHOCYTES NFR BLD AUTO: 13.8 %
MCH RBC QN AUTO: 28.5 PG (ref 26.5–33)
MCHC RBC AUTO-ENTMCNC: 33.3 G/DL (ref 31.5–36.5)
MCV RBC AUTO: 86 FL (ref 78–100)
MONOCYTES # BLD AUTO: 0.9 10E9/L (ref 0–1.3)
MONOCYTES NFR BLD AUTO: 10.6 %
NEUTROPHILS # BLD AUTO: 6.2 10E9/L (ref 1.6–8.3)
NEUTROPHILS NFR BLD AUTO: 73.8 %
NRBC # BLD AUTO: 0 10*3/UL
NRBC BLD AUTO-RTO: 0 /100
PLATELET # BLD AUTO: 215 10E9/L (ref 150–450)
POTASSIUM SERPL-SCNC: 3.4 MMOL/L (ref 3.4–5.3)
PROT SERPL-MCNC: 8.4 G/DL (ref 6.8–8.8)
RBC # BLD AUTO: 5.41 10E12/L (ref 4.4–5.9)
SODIUM SERPL-SCNC: 135 MMOL/L (ref 133–144)
WBC # BLD AUTO: 8.4 10E9/L (ref 4–11)

## 2019-06-11 PROCEDURE — 25800030 ZZH RX IP 258 OP 636: Performed by: PHYSICIAN ASSISTANT

## 2019-06-11 PROCEDURE — 96376 TX/PRO/DX INJ SAME DRUG ADON: CPT

## 2019-06-11 PROCEDURE — 80053 COMPREHEN METABOLIC PANEL: CPT | Performed by: EMERGENCY MEDICINE

## 2019-06-11 PROCEDURE — 25800030 ZZH RX IP 258 OP 636: Performed by: EMERGENCY MEDICINE

## 2019-06-11 PROCEDURE — 25000128 H RX IP 250 OP 636: Performed by: EMERGENCY MEDICINE

## 2019-06-11 PROCEDURE — 99221 1ST HOSP IP/OBS SF/LOW 40: CPT | Performed by: SURGERY

## 2019-06-11 PROCEDURE — 96374 THER/PROPH/DIAG INJ IV PUSH: CPT

## 2019-06-11 PROCEDURE — 83690 ASSAY OF LIPASE: CPT | Performed by: EMERGENCY MEDICINE

## 2019-06-11 PROCEDURE — 85025 COMPLETE CBC W/AUTO DIFF WBC: CPT | Performed by: EMERGENCY MEDICINE

## 2019-06-11 PROCEDURE — 25000128 H RX IP 250 OP 636: Performed by: PHYSICIAN ASSISTANT

## 2019-06-11 PROCEDURE — 83605 ASSAY OF LACTIC ACID: CPT | Performed by: INTERNAL MEDICINE

## 2019-06-11 PROCEDURE — 74177 CT ABD & PELVIS W/CONTRAST: CPT

## 2019-06-11 PROCEDURE — 12000000 ZZH R&B MED SURG/OB

## 2019-06-11 PROCEDURE — 96361 HYDRATE IV INFUSION ADD-ON: CPT

## 2019-06-11 PROCEDURE — C9113 INJ PANTOPRAZOLE SODIUM, VIA: HCPCS | Performed by: PHYSICIAN ASSISTANT

## 2019-06-11 PROCEDURE — 99223 1ST HOSP IP/OBS HIGH 75: CPT | Mod: AI | Performed by: PHYSICIAN ASSISTANT

## 2019-06-11 PROCEDURE — 25000125 ZZHC RX 250: Performed by: INTERNAL MEDICINE

## 2019-06-11 PROCEDURE — 36415 COLL VENOUS BLD VENIPUNCTURE: CPT | Performed by: INTERNAL MEDICINE

## 2019-06-11 PROCEDURE — 96375 TX/PRO/DX INJ NEW DRUG ADDON: CPT

## 2019-06-11 PROCEDURE — 99285 EMERGENCY DEPT VISIT HI MDM: CPT | Mod: 25

## 2019-06-11 RX ORDER — ONDANSETRON 2 MG/ML
4 INJECTION INTRAMUSCULAR; INTRAVENOUS EVERY 6 HOURS PRN
Status: DISCONTINUED | OUTPATIENT
Start: 2019-06-11 | End: 2019-06-16 | Stop reason: HOSPADM

## 2019-06-11 RX ORDER — PANTOPRAZOLE SODIUM 40 MG/10ML
40 INJECTION, POWDER, LYOPHILIZED, FOR SOLUTION INTRAVENOUS EVERY 24 HOURS
Status: DISCONTINUED | OUTPATIENT
Start: 2019-06-11 | End: 2019-06-11

## 2019-06-11 RX ORDER — ACETAMINOPHEN 500 MG
500 TABLET ORAL EVERY 6 HOURS PRN
COMMUNITY

## 2019-06-11 RX ORDER — KETOROLAC TROMETHAMINE 15 MG/ML
15 INJECTION, SOLUTION INTRAMUSCULAR; INTRAVENOUS ONCE
Status: COMPLETED | OUTPATIENT
Start: 2019-06-11 | End: 2019-06-11

## 2019-06-11 RX ORDER — PROCHLORPERAZINE 25 MG
25 SUPPOSITORY, RECTAL RECTAL EVERY 12 HOURS PRN
Status: DISCONTINUED | OUTPATIENT
Start: 2019-06-11 | End: 2019-06-16 | Stop reason: HOSPADM

## 2019-06-11 RX ORDER — HYDROMORPHONE HYDROCHLORIDE 1 MG/ML
.3-.5 INJECTION, SOLUTION INTRAMUSCULAR; INTRAVENOUS; SUBCUTANEOUS
Status: DISCONTINUED | OUTPATIENT
Start: 2019-06-11 | End: 2019-06-16 | Stop reason: HOSPADM

## 2019-06-11 RX ORDER — IOPAMIDOL 755 MG/ML
500 INJECTION, SOLUTION INTRAVASCULAR ONCE
Status: COMPLETED | OUTPATIENT
Start: 2019-06-11 | End: 2019-06-11

## 2019-06-11 RX ORDER — ONDANSETRON 2 MG/ML
4 INJECTION INTRAMUSCULAR; INTRAVENOUS EVERY 30 MIN PRN
Status: DISCONTINUED | OUTPATIENT
Start: 2019-06-11 | End: 2019-06-11

## 2019-06-11 RX ORDER — NALOXONE HYDROCHLORIDE 0.4 MG/ML
.1-.4 INJECTION, SOLUTION INTRAMUSCULAR; INTRAVENOUS; SUBCUTANEOUS
Status: DISCONTINUED | OUTPATIENT
Start: 2019-06-11 | End: 2019-06-16 | Stop reason: HOSPADM

## 2019-06-11 RX ORDER — SODIUM CHLORIDE 9 MG/ML
INJECTION, SOLUTION INTRAVENOUS CONTINUOUS
Status: DISCONTINUED | OUTPATIENT
Start: 2019-06-11 | End: 2019-06-16 | Stop reason: HOSPADM

## 2019-06-11 RX ORDER — ACETAMINOPHEN 325 MG/1
650 TABLET ORAL EVERY 4 HOURS PRN
Status: DISCONTINUED | OUTPATIENT
Start: 2019-06-11 | End: 2019-06-16 | Stop reason: HOSPADM

## 2019-06-11 RX ORDER — POLYETHYLENE GLYCOL 3350 17 G/17G
17 POWDER, FOR SOLUTION ORAL DAILY PRN
Status: DISCONTINUED | OUTPATIENT
Start: 2019-06-11 | End: 2019-06-16 | Stop reason: HOSPADM

## 2019-06-11 RX ORDER — LOSARTAN POTASSIUM 25 MG/1
25 TABLET ORAL DAILY
COMMUNITY

## 2019-06-11 RX ORDER — METOPROLOL TARTRATE 1 MG/ML
5 INJECTION, SOLUTION INTRAVENOUS EVERY 6 HOURS
Status: DISCONTINUED | OUTPATIENT
Start: 2019-06-11 | End: 2019-06-12

## 2019-06-11 RX ORDER — PROCHLORPERAZINE MALEATE 10 MG
10 TABLET ORAL EVERY 6 HOURS PRN
Status: DISCONTINUED | OUTPATIENT
Start: 2019-06-11 | End: 2019-06-16 | Stop reason: HOSPADM

## 2019-06-11 RX ORDER — ONDANSETRON 4 MG/1
4 TABLET, ORALLY DISINTEGRATING ORAL EVERY 6 HOURS PRN
Status: DISCONTINUED | OUTPATIENT
Start: 2019-06-11 | End: 2019-06-16 | Stop reason: HOSPADM

## 2019-06-11 RX ORDER — OXYCODONE HYDROCHLORIDE 5 MG/1
5-10 TABLET ORAL
Status: DISCONTINUED | OUTPATIENT
Start: 2019-06-11 | End: 2019-06-12

## 2019-06-11 RX ORDER — HYDROMORPHONE HYDROCHLORIDE 1 MG/ML
0.5 INJECTION, SOLUTION INTRAMUSCULAR; INTRAVENOUS; SUBCUTANEOUS EVERY 30 MIN PRN
Status: DISCONTINUED | OUTPATIENT
Start: 2019-06-11 | End: 2019-06-11

## 2019-06-11 RX ADMIN — HYDROMORPHONE HYDROCHLORIDE 0.5 MG: 1 INJECTION, SOLUTION INTRAMUSCULAR; INTRAVENOUS; SUBCUTANEOUS at 16:17

## 2019-06-11 RX ADMIN — HYDROMORPHONE HYDROCHLORIDE 0.5 MG: 1 INJECTION, SOLUTION INTRAMUSCULAR; INTRAVENOUS; SUBCUTANEOUS at 11:10

## 2019-06-11 RX ADMIN — ONDANSETRON 4 MG: 2 INJECTION INTRAMUSCULAR; INTRAVENOUS at 11:10

## 2019-06-11 RX ADMIN — HYDROMORPHONE HYDROCHLORIDE 0.5 MG: 1 INJECTION, SOLUTION INTRAMUSCULAR; INTRAVENOUS; SUBCUTANEOUS at 13:53

## 2019-06-11 RX ADMIN — SODIUM CHLORIDE 1000 ML: 9 INJECTION, SOLUTION INTRAVENOUS at 05:50

## 2019-06-11 RX ADMIN — IOPAMIDOL 100 ML: 755 INJECTION, SOLUTION INTRAVENOUS at 06:18

## 2019-06-11 RX ADMIN — KETOROLAC TROMETHAMINE 15 MG: 15 INJECTION, SOLUTION INTRAMUSCULAR; INTRAVENOUS at 05:54

## 2019-06-11 RX ADMIN — METOPROLOL TARTRATE 5 MG: 1 INJECTION, SOLUTION INTRAVENOUS at 15:38

## 2019-06-11 RX ADMIN — HYDROMORPHONE HYDROCHLORIDE 0.5 MG: 1 INJECTION, SOLUTION INTRAMUSCULAR; INTRAVENOUS; SUBCUTANEOUS at 22:17

## 2019-06-11 RX ADMIN — ONDANSETRON 4 MG: 2 INJECTION INTRAMUSCULAR; INTRAVENOUS at 05:51

## 2019-06-11 RX ADMIN — HYDROMORPHONE HYDROCHLORIDE 0.5 MG: 1 INJECTION, SOLUTION INTRAMUSCULAR; INTRAVENOUS; SUBCUTANEOUS at 07:36

## 2019-06-11 RX ADMIN — SODIUM CHLORIDE: 9 INJECTION, SOLUTION INTRAVENOUS at 08:42

## 2019-06-11 RX ADMIN — PROCHLORPERAZINE EDISYLATE 10 MG: 5 INJECTION INTRAMUSCULAR; INTRAVENOUS at 13:53

## 2019-06-11 RX ADMIN — METOPROLOL TARTRATE 5 MG: 1 INJECTION, SOLUTION INTRAVENOUS at 21:38

## 2019-06-11 RX ADMIN — SODIUM CHLORIDE 65 ML: 9 INJECTION, SOLUTION INTRAVENOUS at 06:18

## 2019-06-11 RX ADMIN — SODIUM CHLORIDE, POTASSIUM CHLORIDE, SODIUM LACTATE AND CALCIUM CHLORIDE 1000 ML: 600; 310; 30; 20 INJECTION, SOLUTION INTRAVENOUS at 07:41

## 2019-06-11 RX ADMIN — PANTOPRAZOLE SODIUM 40 MG: 40 INJECTION, POWDER, FOR SOLUTION INTRAVENOUS at 11:10

## 2019-06-11 RX ADMIN — ONDANSETRON 4 MG: 2 INJECTION INTRAMUSCULAR; INTRAVENOUS at 07:35

## 2019-06-11 ASSESSMENT — ACTIVITIES OF DAILY LIVING (ADL)
ADLS_ACUITY_SCORE: 10
ADLS_ACUITY_SCORE: 10
ADLS_ACUITY_SCORE: 12

## 2019-06-11 ASSESSMENT — ENCOUNTER SYMPTOMS
ABDOMINAL DISTENTION: 1
NAUSEA: 1
ABDOMINAL PAIN: 1
DIARRHEA: 1
FEVER: 0

## 2019-06-11 NOTE — PLAN OF CARE
Pt is here for abd. Pain and possible SBO. NG tube attempted twice, pt could not tolerate, gag reflux and choking sensation from pt.  VSS Pain 8/10 IV dilaudid given. Pt complaining of NV, IV zofran given. Pt is on IV Protonix. Surgery consult placed. Pt currently NPO. Up IND. Plan is to monitor pain and bowel sounds. Will continue to monitor.

## 2019-06-11 NOTE — PHARMACY-ADMISSION MEDICATION HISTORY
Admission medication history interview status for this patient is complete. See [facility] admission navigator for allergy information, prior to admission medications and immunization status.     Medication history interview source(s):Patient  Medication history resources (including written lists, pill bottles, clinic record): [facility]  Primary pharmacy:[facility]    Changes made to [facility] medication list:  Added: tylenol, losartan 25mg  Deleted: metoprolol (last filled 5 yrs ago)  Changed: None    Actions taken by pharmacist (provider contacted, etc):None     Additional medication history information: [facility] pharmacy    Medication reconciliation/reorder completed by provider prior to medication history? No    Do you take OTC medications (eg tylenol, ibuprofen, fish oil, eye/ear drops, etc)? Yes    Prior to Admission medications    Medication Sig Last Dose Taking? Auth Provider   acetaminophen (TYLENOL) 500 MG tablet Take 500 mg by mouth every 6 hours as needed for mild pain Past Week at Unknown time Yes Unknown, Entered By History   DILTIAZEM HCL PO Take 360 mg by mouth  6/10/2019 at PM Yes Reported, Patient   losartan (COZAAR) 25 MG tablet Take 25 mg by mouth daily 6/10/2019 at Unknown time Yes Unknown, Entered By History

## 2019-06-11 NOTE — CONSULTS
[facility] Surgery Consultation    Triston Rausch MRN# [id number]   Age: 54 year old YOB: 1965     Date of Admission:  6/11/2019    Reason for consult: Abdominal pain       Requesting physician: Malena       Level of consult: Consult, follow and place orders           Assessment and Plan:   Assessment:   Abdominal pain, history suggestive of viral illness - possible ileus  Patient Active Problem List    Diagnosis Date Noted     SBO (small bowel obstruction) (H) 06/11/2019     Priority: Medium     Umbilical and inguinal hernias without bowel involvement      Plan:   Abdominal films as needed to follow progress  Laparotomy/ laparoscopy if fails to resolve  Pt requests NG to see if it helps with nausea and discomfort  Will add PPI            Chief Complaint:   Abdominal pain     History is obtained from the patient and electronic health record         History of Present Illness:   This patient is a 54 year old  male with a significant past medical history of SONG, Afib, CKI, HTN, hernia repair and kidney stones who presents with the following condition requiring a hospital admission: abdominal pain. CT shows proximal dilated and distal decompressed bowel but no transition point on CT. Both he and his wife have had nausea,vomiting and diarrhea for 3 days. He then felt better but developed abdominal pain again yesterday so came to ER          Past Medical History:     Past Medical History:   Diagnosis Date     Atrial fib/flutter, transient      Chronic kidney disease      Hypertension              Past Surgical History:     Past Surgical History:   Procedure Laterality Date     GENITOURINARY SURGERY               Social History:     Social History     Tobacco Use     Smoking status: Never Smoker     Smokeless tobacco: Never Used   Substance Use Topics     Alcohol use: No             Family History:   No family history on file.          Immunizations:     VACCINE/DOSE   Diptheria   DPT  "  DTAP   HBIG   Hepatitis A   Hepatitis B   HIB   Influenza   Measles   Meningococcal   MMR   Mumps   Pneumococcal   Polio   Rubella   Small Pox   TDAP   Varicella   Zoster             Allergies:   No Known Allergies          Medications:     Current Facility-Administered Medications   Medication     acetaminophen (TYLENOL) tablet 650 mg     HYDROmorphone (PF) (DILAUDID) injection 0.3-0.5 mg     melatonin tablet 1 mg     naloxone (NARCAN) injection 0.1-0.4 mg     ondansetron (ZOFRAN-ODT) ODT tab 4 mg    Or     ondansetron (ZOFRAN) injection 4 mg     oxyCODONE (ROXICODONE) tablet 5-10 mg     pantoprazole (PROTONIX) 40 mg IV push injection     polyethylene glycol (MIRALAX/GLYCOLAX) Packet 17 g     prochlorperazine (COMPAZINE) injection 10 mg    Or     prochlorperazine (COMPAZINE) tablet 10 mg    Or     prochlorperazine (COMPAZINE) Suppository 25 mg     sodium chloride 0.9% infusion             Review of Systems:   CV: NEGATIVE for chest pain, palpitations or peripheral edema  C: NEGATIVE for fever, chills, change in weight  E/M: NEGATIVE for ear, mouth and throat problems  R: NEGATIVE for significant cough or SOB          Physical Exam:   All vitals have been reviewed  Patient Vitals for the past 24 hrs:   BP Temp Temp src Pulse Heart Rate Resp SpO2 Height Weight   06/11/19 0815 122/56 98  F (36.7  C) Oral -- 71 20 93 % 1.905 m (6' 3\") --   06/11/19 0800 -- -- -- 70 -- -- 92 % -- --   06/11/19 0745 99/54 -- -- -- -- -- 92 % -- --   06/11/19 0730 -- -- -- -- -- -- 94 % -- --   06/11/19 0715 110/65 -- -- -- -- -- 91 % -- --   06/11/19 0705 -- -- -- -- -- -- 93 % -- --   06/11/19 0700 136/62 -- -- 74 -- -- 92 % -- --   06/11/19 0650 -- -- -- -- -- -- 94 % -- --   06/11/19 0645 136/65 -- -- 74 -- -- 94 % -- --   06/11/19 0534 124/82 97  F (36.1  C) -- -- 80 18 93 % -- (!) 158.8 kg (350 lb)     No intake or output data in the 24 hours ending 06/11/19 1308    Sclera: clear    Neck:   skin normal and no stridor     Chest / " Breast:   Nl resp effort     Abdomen:   , firm, rounded and obese,distended, mild tenderness noted diffusely, voluntary guarding absent, no masses palpated and hernia present at umbilicus and non-tender             Data:   All laboratory data reviewed  Results for orders placed or performed during the hospital encounter of 06/11/19   Abd/pelvis CT,  IV  contrast only TRAUMA / AAA    Narrative    CT ABDOMEN PELVIS W CONTRAST   6/11/2019 6:22 AM     HISTORY: Abdominal pain, distension, vomiting.    TECHNIQUE:  CT abdomen and pelvis with 100mL Isovue-370 IV. Radiation  dose for this scan was reduced using automated exposure control,  adjustment of the mA and/or kV according to patient size, or iterative  reconstruction technique.    COMPARISON: 8/30/2007.    FINDINGS:  Abdomen: There is atelectasis and scarring at the lung bases. The  heart size is normal. The liver, spleen, gallbladder, pancreas and  adrenal glands are normal in appearance. There are a few small  low-attenuation renal cortical lesions bilaterally which are likely  cysts. There are two small stones in the lower pole of the left kidney  and a single small stone in the lower pole of the right kidney. No  ureteral stone or hydronephrosis. There is no abdominal or pelvic  lymph node enlargement. Multiple subcentimeter retroperitoneal and  mesenteric lymph nodes.    Pelvis: There are multiple dilated proximal small bowel loops. Distal  small bowel and colon are nondilated. Transition point appears to be  in the mid abdomen. No cause of obstruction is evident. There is mild  mesenteric edema associated with dilated small bowel loops. No free  intraperitoneal gas or fluid. A few pelvic phleboliths.      Impression    IMPRESSION:   1. Mid small bowel obstruction.  2. Bilateral renal stones. No ureteral stone or hydronephrosis.    ARTIE GAYTAN MD   CBC with platelets differential   Result Value Ref Range    WBC 8.4 4.0 - 11.0 10e9/L    RBC Count 5.41 4.4 -  5.9 10e12/L    Hemoglobin 15.4 13.3 - 17.7 g/dL    Hematocrit 46.3 40.0 - 53.0 %    MCV 86 78 - 100 fl    MCH 28.5 26.5 - 33.0 pg    MCHC 33.3 31.5 - 36.5 g/dL    RDW 12.9 10.0 - 15.0 %    Platelet Count 215 150 - 450 10e9/L    Diff Method Automated Method     % Neutrophils 73.8 %    % Lymphocytes 13.8 %    % Monocytes 10.6 %    % Eosinophils 1.4 %    % Basophils 0.2 %    % Immature Granulocytes 0.2 %    Nucleated RBCs 0 0 /100    Absolute Neutrophil 6.2 1.6 - 8.3 10e9/L    Absolute Lymphocytes 1.2 0.8 - 5.3 10e9/L    Absolute Monocytes 0.9 0.0 - 1.3 10e9/L    Absolute Eosinophils 0.1 0.0 - 0.7 10e9/L    Absolute Basophils 0.0 0.0 - 0.2 10e9/L    Abs Immature Granulocytes 0.0 0 - 0.4 10e9/L    Absolute Nucleated RBC 0.0    Comprehensive metabolic panel   Result Value Ref Range    Sodium 135 133 - 144 mmol/L    Potassium 3.4 3.4 - 5.3 mmol/L    Chloride 103 94 - 109 mmol/L    Carbon Dioxide 26 20 - 32 mmol/L    Anion Gap 6 3 - 14 mmol/L    Glucose 115 (H) 70 - 99 mg/dL    Urea Nitrogen 18 7 - 30 mg/dL    Creatinine 1.12 0.66 - 1.25 mg/dL    GFR Estimate 75 >60 mL/min/[1.73_m2]    GFR Estimate If Black 87 >60 mL/min/[1.73_m2]    Calcium 8.2 (L) 8.5 - 10.1 mg/dL    Bilirubin Total 0.7 0.2 - 1.3 mg/dL    Albumin 3.8 3.4 - 5.0 g/dL    Protein Total 8.4 6.8 - 8.8 g/dL    Alkaline Phosphatase 130 40 - 150 U/L    ALT 34 0 - 70 U/L    AST 25 0 - 45 U/L   Lipase   Result Value Ref Range    Lipase 252 73 - 393 U/L        Attestation:  I have reviewed today's vital signs, notes, medications, labs and imaging.  Amount of time performed on this consult: 45 minutes.    Isac Godoy MD

## 2019-06-11 NOTE — ED TRIAGE NOTES
"Nausea and vomiting and diarrhea for 2 days, now woke up feeling\"bloated\" and having abd pain,    Alert and oriented ABC intact  "

## 2019-06-11 NOTE — PROVIDER NOTIFICATION
MD paged pt in 353 JK, attempted twice for the NG tube, pt could not tolerate. Gag reflux and pt felt like he was being choked.

## 2019-06-11 NOTE — ED PROVIDER NOTES
History     Chief Complaint:  Abdominal Pain    HPI:   The history is provided by the patient.      Triston Rausch is a 54 year old male with history of transient atrial fib/flutter, hyperlipidemia, and hypertension who presents with abdominal pain. The patient states that 3 days ago, both he and his wife were sick with diarrhea and vomiting. The patient states that yesterday he felt improved, but last night he had a recurrence of diarrhea and emesis. This morning, the patient states he woke up with increased abdominal pain and abdominal distention, prompting his evaluation. No black or bloody stool.     Allergies:  No known drug allergies      Medications:    Diltiazem   Metoprolol     Past Medical History:    Atrial fib/flutter transient  CKD  Hypertension  Hyperlipidemia   SONG    Past Surgical History:    Hernia repair    surgery    Family History:    Hypertension: father, sister  Atrial fib: sister  High cholesterol: sister    Social History:  PCP: [facility]   Marital Status:    Smoking status: never  Alcohol use: Negative       Review of Systems   Constitutional: Negative for fever.   Cardiovascular: Negative for chest pain.   Gastrointestinal: Positive for abdominal distention, abdominal pain, diarrhea and nausea.   Skin: Negative for rash.   All other systems reviewed and are negative.    Physical Exam     Patient Vitals for the past 24 hrs:   BP Temp Heart Rate Resp SpO2 Weight   06/11/19 0534 124/82 97  F (36.1  C) 80 18 93 % (!) 158.8 kg (350 lb)        Physical Exam  Gen: well appearing, in no acute distress  HEENT:  mmm, no rhinorrhea  Neck: supple, no abnormal swelling  Lungs:  CTAB,  no resp distress  CV: rrr, no m/r/g, ppi  Abd: soft, mild generalized ttp, moderate distension, no rebound or guarding  Ext: no peripheral edema  Skin: warm, dry, well perfused, no rashes/bruising/lesions on exposed skin  Neuro: alert, MAEE, no gross motor or sensory deficits, gait  stable  Psych: Normal mood, normal affect      Emergency Department Course     Imaging:  Radiographic findings were communicated with the patient who voiced understanding of the findings.    Abd/pelvis CT, IV contrast only trauma/AAA  Impression:   Preliminary result by Rojas Navarro MD (06/11/19 06:34:32)                Impression:    IMPRESSION:   1. Mid small bowel obstruction.  2. Bilateral renal stones. No ureteral stone or hydronephrosis.            Narrative:    CT ABDOMEN PELVIS W CONTRAST   6/11/2019 6:22 AM     HISTORY: Abdominal pain, distension, vomiting.    TECHNIQUE:  CT abdomen and pelvis with 100mL Isovue-370 IV. Radiation  dose for this scan was reduced using automated exposure control,  adjustment of the mA and/or kV according to patient size, or iterative  reconstruction technique.    COMPARISON: 8/30/2007.    FINDINGS:  Abdomen: There is atelectasis and scarring at the lung bases. The  heart size is normal. The liver, spleen, gallbladder, pancreas and  adrenal glands are normal in appearance. There are a few small  low-attenuation renal cortical lesions bilaterally which are likely  cysts. There are two small stones in the lower pole of the left kidney  and a single small stone in the lower pole of the right kidney. No  ureteral stone or hydronephrosis. There is no abdominal or pelvic  lymph node enlargement. Multiple subcentimeter retroperitoneal and  mesenteric lymph nodes.    Pelvis: There are multiple dilated proximal small bowel loops. Distal  small bowel and colon are nondilated. Transition point appears to be  in the mid abdomen. No cause of obstruction is evident. There is mild  mesenteric edema associated with dilated small bowel loops. No free  intraperitoneal gas or fluid. A few pelvic phleboliths.                    As read by Radiology.     Laboratory:  CBC: WNL (WBC 8.4, HGB 15.4, )   CMP: glucose 115, calcium 8.2, o/w WNL (Creatinine 1.12)  Lipase:  252    Procedures:          Interventions:  Medications   ondansetron (ZOFRAN) injection 4 mg (4 mg Intravenous Given 19 0551)   0.9% sodium chloride BOLUS (1,000 mLs Intravenous New Bag 19 0550)   lactated ringers BOLUS 1,000 mL (has no administration in time range)   HYDROmorphone (PF) (DILAUDID) injection 0.5 mg (has no administration in time range)   ketorolac (TORADOL) injection 15 mg (15 mg Intravenous Given 19 0554)   iopamidol (ISOVUE-370) solution 500 mL (100 mLs Intravenous Given 19 0618)   0.9% sodium chloride BOLUS (0 mLs Intravenous Stopped 19)        Emergency Department Course:  Past medical records, nursing notes, and vitals reviewed.  0545: I performed an exam of the patient and obtained history, as documented above.  IV started and blood drawn for laboratory testing. Results are as above.      The patient was sent for a CT imaging while in the emergency department, findings above.         Impression & Plan      CMS Diagnoses:         Medical Decision Makin 4-year-old male with a remote history of a right inguinal hernia repair here with abdominal pain vomiting and diarrhea.  Family members had similar GI symptoms over the weekend but his has persisted since now with new abdominal distention and worsening of symptoms.  Found to have a small bowel obstruction here in the emergency room.  Blood tests are unremarkable hemodynamics are stable plan will be admission for hospitalist service to a medical bed surgical consultation if not improving as expected with conservative management.      Critical Care time:  none    Diagnosis:    ICD-10-CM    1. SBO (small bowel obstruction) (H) K56.609    2. Nausea vomiting and diarrhea R11.2     R19.7        Disposition:  Admitted to Medical Bed    Discharge Medications:     Medication List      There are no discharge medications for this visit.       I, Megan Beh, am serving as a scribe at 5:45 AM on 2019 to document services  personally performed by Chintan Pearson MD based on my observations and the provider's statements to me.      Megan Beh  6/11/2019   [facility] EMERGENCY DEPARTMENT       Chintan Pearson MD  06/11/19 0603

## 2019-06-11 NOTE — ED NOTES
[facility]  ED Nurse Handoff Report    Triston Rausch is a 54 year old male   ED Chief complaint: Abdominal Pain  . ED Diagnosis:   Final diagnoses:   SBO (small bowel obstruction) (H)   Nausea vomiting and diarrhea     Allergies: No Known Allergies    Code Status: Full Code  Activity level - Baseline/Home:  Independent. Activity Level - Current:   Stand by Assist. Lift room needed: No. Bariatric: No   Needed: No   Isolation: No. Infection: Not Applicable.     Vital Signs:   Vitals:    06/11/19 0534 06/11/19 0645 06/11/19 0650   BP: 124/82 136/65    Pulse:  74    Resp: 18     Temp: 97  F (36.1  C)     SpO2: 93% 94% 94%   Weight: (!) 158.8 kg (350 lb)         Cardiac Rhythm:  ,      Pain level: 0-10 Pain Scale: 7  Patient confused: No. Patient Falls Risk: Yes.   Elimination Status: Has voided   Patient Report - Initial Complaint: Abd pain, nausea. Focused Assessment:      Tests Performed: Labs, CT. Abnormal Results:   Labs Ordered and Resulted from Time of ED Arrival Up to the Time of Departure from the ED   COMPREHENSIVE METABOLIC PANEL - Abnormal; Notable for the following components:       Result Value    Glucose 115 (*)     Calcium 8.2 (*)     All other components within normal limits   CBC WITH PLATELETS DIFFERENTIAL   LIPASE     Abd/pelvis CT,  IV  contrast only TRAUMA / AAA   Final Result   IMPRESSION:    1. Mid small bowel obstruction.   2. Bilateral renal stones. No ureteral stone or hydronephrosis.      ARTIE GAYTAN MD      .   Treatments provided: IV fluids, toradol, zofran   Family Comments: Wife intermittently at bedside  OBS brochure/video discussed/provided to patient:  N/A  ED Medications:   Medications   ondansetron (ZOFRAN) injection 4 mg (4 mg Intravenous Given 6/11/19 0551)   lactated ringers BOLUS 1,000 mL (has no administration in time range)   HYDROmorphone (PF) (DILAUDID) injection 0.5 mg (has no administration in time range)   0.9% sodium chloride BOLUS (1,000  mLs Intravenous New Bag 6/11/19 0550)   ketorolac (TORADOL) injection 15 mg (15 mg Intravenous Given 6/11/19 0554)   iopamidol (ISOVUE-370) solution 500 mL (100 mLs Intravenous Given 6/11/19 0618)   0.9% sodium chloride BOLUS (0 mLs Intravenous Stopped 6/11/19 0619)     Drips infusing:  No  For the majority of the shift, the patient's behavior Green. Interventions performed were NA.     Severe Sepsis OR Septic Shock Diagnosis Present: No      ED Nurse Name/Phone Number: Jaun Chester,   6:57 AM    RECEIVING UNIT ED HANDOFF REVIEW    Above ED Nurse Handoff Report was reviewed: Yes  Reviewed by: Hermila Baird on June 11, 2019 at 7:55 AM

## 2019-06-11 NOTE — H&P
[facility]    Hospitalist History and Physical    Name: Triston Rausch    MRN: [id number]  YOB: 1965    Age: 54 year old  Date of Admission:  6/11/2019  Date of Service (when I saw the patient): 06/11/19    Assessment & Plan   Triston Rausch is a 54 year old male with PMH significant for paroxysmal A-fib, HTN, SONG not using CPAP, kidney stones, and HLD who presents to the ED for evaluation of abdominal pain. ED workup reveals VSS, CMP unremarkable, lipase WNL, glucose of 115, CBC WNL, and CT of abdomen/pelvis shows mid small bowel obstruction with bilateral renal stones and no ureteral stone or hydronephrosis.     #SBO: acute onset of central abdominal pain with associated nausea and vomiting along with abdominal distention. CT scan shows mid small bowel obstruction with transition point in the mid abdomen without cause of obstruction evident. No signs of perforation. No prior h/o bowel obstruction. No previous colonoscopy. No family history of colon cancer. Last episode of diarrhea this morning at 3 AM.    - NPO  - IVF hydration with NS at 100 ml/hour  - Will hold off on NG tube at this time  - Supportive care with pain control and antiemetics   - Hold off on general surgery consult for now    #Recent gastroenteritis: nausea, vomiting, and diarrhea from 6/8-6/9/19, suspect viral etiology with wife having the same symptoms. Minimal PO intake over the weekend. Symptoms resolving.     #Paroxysmal A-fib: s/p cardiac pulmonary vein isolation ablation in 10/2017 and s/p cardioversion in 08/2018, 11/2018, and 01/2019. Follows with cardiology through [facility]. Not on anticoagulation due to [id number] score of 1 (HTN). Currently denies chest pain and palpitations.  - Resume PTA Diltiazem with parameters once med rec complete        #HTN: stable, continue PTA Losartan and Diltiazem once med rec complete     #SONG: intolerant to CPAP  - PRN supplemental oxygen while sleeping     #HLD:  most recent lipid panel in 03/2017 showed total of 207, triglycerides of 132, HDL of 41, and LDL of 140. Not on medical management.    DVT Prophylaxis: Pneumatic Compression Devices and Ambulate every shift  Code Status: Full Code, discussed with patient   Disposition: Expected stay >2 midnights, will admit to inpatient     Primary Care Physician   [facility]    Chief Complaint   Abdominal pain     History obtained from discussion with ED provider, chart review, and interview with patient     History of Present Illness   Triston Rausch is a 54 year old male who presents with abdominal pain. The patient states this past weekend on Saturday and Sunday he had nausea with a couple episodes of vomiting as well nonbloody diarrhea about every 30 minutes. The patient was feeling better Monday afternoon and due to this ate lunch at [employer]. Due to some indigestion after eating [employer] the patient took a dose of Pepto-Bismol.  Around 2 AM today the patient woke up due to central abdominal pain that was sharp in character with radiation into his back with associated distention and feeling gassy.  The patient states that his symptoms were related to indigestion and took a dose of Hilda-Carteret with little improvement. The patient reports associated nausea with 2 episodes of nonbloody emesis this morning.  The patient had a small episode of nonbloody diarrhea around 3 AM.  He reports not passing flatus since his last stool.  He reports feeling subjectively warm, chilled, and diaphoretic since this weekend.  He notes having a difficult time keeping up with his fluid intake with being ill over the weekend and has felt lightheaded and dizzy with standing.  He has been taking over-the-counter Tylenol as needed for pain over the weekend.  Denies urinary symptoms..  Patient reports 1 episode of stool incontinence over the weekend.  Denies previous episodes of abdominal pain similar to this.  The patient  states that his wife had similar symptoms to him but hers have resolved.  Denies melena or hematochezia.  No family history of colon cancer.  The patient has not had a colonoscopy previously.    Past Medical History    Past Medical History:   Diagnosis Date     Atrial fib/flutter, transient      Chronic kidney disease      Hypertension      Past Surgical History   Past Surgical History:   Procedure Laterality Date     GENITOURINARY SURGERY       Prior to Admission Medications   Prior to Admission Medications   Prescriptions Last Dose Informant Patient Reported? Taking?   DILTIAZEM HCL PO   Yes Yes   Sig: Take 360 mg by mouth    metoprolol (TOPROL-XL) 25 MG 24 hr tablet   No Yes   Sig: Take 1 tablet by mouth daily.      Facility-Administered Medications: None     Allergies   No Known Allergies    Social History   Social History     Tobacco Use     Smoking status: Never Smoker     Smokeless tobacco: Never Used   Substance Use Topics     Alcohol use: No     Social History     Social History Narrative     Not on file     Family History   Family history reviewed with patient and is noncontributory.    Review of Systems   A Comprehensive greater than 10 system review of systems was carried out.  Pertinent positives and negatives are noted above.  Otherwise negative for contributory information.    Physical Exam   Temp: 97  F (36.1  C)   BP: 136/62 Pulse: 74 Heart Rate: 80 Resp: 18 SpO2: 93 %      Vital Signs with Ranges  Temp:  [97  F (36.1  C)] 97  F (36.1  C)  Pulse:  [74] 74  Heart Rate:  [80] 80  Resp:  [18] 18  BP: (124-136)/(62-82) 136/62  SpO2:  [92 %-94 %] 93 %  350 lbs 0 oz     GEN:  Alert, oriented x 3, appears comfortable, no overt distress  HEENT:  Normocephalic/atraumatic, no scleral icterus, no nasal discharge, mouth moist.  CV:  Regular rate and rhythm, no murmur or JVD.  S1 + S2 noted, no S3 or S4.  LUNGS:  Clear to auscultation bilaterally without rales/rhonchi/wheezing/retractions.  Symmetric chest rise  on inhalation noted.  ABD: Hypoactive bowel sounds, moderate distention, tender in center of abdomen between umbilicus and epigastrium region.  No rebound/guarding/rigidity.  EXT:  No edema.  No cyanosis.  No acute joint synovitis noted.  SKIN:  Dry to touch, no exanthems noted in the visualized areas.  NEURO:  Symmetric muscle strength, sensation to touch grossly intact.  Coordination symmetric on general exam.  No new focal deficits appreciated.    Data   Data reviewed today:  I personally reviewed all labs and imaging from this visit.     Results for orders placed or performed during the hospital encounter of 06/11/19   Abd/pelvis CT,  IV  contrast only TRAUMA / AAA    Narrative    CT ABDOMEN PELVIS W CONTRAST   6/11/2019 6:22 AM     HISTORY: Abdominal pain, distension, vomiting.    TECHNIQUE:  CT abdomen and pelvis with 100mL Isovue-370 IV. Radiation  dose for this scan was reduced using automated exposure control,  adjustment of the mA and/or kV according to patient size, or iterative  reconstruction technique.    COMPARISON: 8/30/2007.    FINDINGS:  Abdomen: There is atelectasis and scarring at the lung bases. The  heart size is normal. The liver, spleen, gallbladder, pancreas and  adrenal glands are normal in appearance. There are a few small  low-attenuation renal cortical lesions bilaterally which are likely  cysts. There are two small stones in the lower pole of the left kidney  and a single small stone in the lower pole of the right kidney. No  ureteral stone or hydronephrosis. There is no abdominal or pelvic  lymph node enlargement. Multiple subcentimeter retroperitoneal and  mesenteric lymph nodes.    Pelvis: There are multiple dilated proximal small bowel loops. Distal  small bowel and colon are nondilated. Transition point appears to be  in the mid abdomen. No cause of obstruction is evident. There is mild  mesenteric edema associated with dilated small bowel loops. No free  intraperitoneal gas or  fluid. A few pelvic phleboliths.      Impression    IMPRESSION:   1. Mid small bowel obstruction.  2. Bilateral renal stones. No ureteral stone or hydronephrosis.    ARTIE GAYTAN MD   CBC with platelets differential   Result Value Ref Range    WBC 8.4 4.0 - 11.0 10e9/L    RBC Count 5.41 4.4 - 5.9 10e12/L    Hemoglobin 15.4 13.3 - 17.7 g/dL    Hematocrit 46.3 40.0 - 53.0 %    MCV 86 78 - 100 fl    MCH 28.5 26.5 - 33.0 pg    MCHC 33.3 31.5 - 36.5 g/dL    RDW 12.9 10.0 - 15.0 %    Platelet Count 215 150 - 450 10e9/L    Diff Method Automated Method     % Neutrophils 73.8 %    % Lymphocytes 13.8 %    % Monocytes 10.6 %    % Eosinophils 1.4 %    % Basophils 0.2 %    % Immature Granulocytes 0.2 %    Nucleated RBCs 0 0 /100    Absolute Neutrophil 6.2 1.6 - 8.3 10e9/L    Absolute Lymphocytes 1.2 0.8 - 5.3 10e9/L    Absolute Monocytes 0.9 0.0 - 1.3 10e9/L    Absolute Eosinophils 0.1 0.0 - 0.7 10e9/L    Absolute Basophils 0.0 0.0 - 0.2 10e9/L    Abs Immature Granulocytes 0.0 0 - 0.4 10e9/L    Absolute Nucleated RBC 0.0    Comprehensive metabolic panel   Result Value Ref Range    Sodium 135 133 - 144 mmol/L    Potassium 3.4 3.4 - 5.3 mmol/L    Chloride 103 94 - 109 mmol/L    Carbon Dioxide 26 20 - 32 mmol/L    Anion Gap 6 3 - 14 mmol/L    Glucose 115 (H) 70 - 99 mg/dL    Urea Nitrogen 18 7 - 30 mg/dL    Creatinine 1.12 0.66 - 1.25 mg/dL    GFR Estimate 75 >60 mL/min/[1.73_m2]    GFR Estimate If Black 87 >60 mL/min/[1.73_m2]    Calcium 8.2 (L) 8.5 - 10.1 mg/dL    Bilirubin Total 0.7 0.2 - 1.3 mg/dL    Albumin 3.8 3.4 - 5.0 g/dL    Protein Total 8.4 6.8 - 8.8 g/dL    Alkaline Phosphatase 130 40 - 150 U/L    ALT 34 0 - 70 U/L    AST 25 0 - 45 U/L   Lipase   Result Value Ref Range    Lipase 252 73 - 393 U/L     Temi LONGORIA I discussed the patient with Dr. Yung and he agrees with the above plan.

## 2019-06-11 NOTE — ED NOTES
Pt says pain and nausea are controlled at this time, declines additional medication, rates pain 2/10.

## 2019-06-12 ENCOUNTER — APPOINTMENT (OUTPATIENT)
Dept: GENERAL RADIOLOGY | Facility: CLINIC | Age: 54
End: 2019-06-12
Attending: SURGERY
Payer: COMMERCIAL

## 2019-06-12 LAB
ANION GAP SERPL CALCULATED.3IONS-SCNC: 5 MMOL/L (ref 3–14)
BUN SERPL-MCNC: 15 MG/DL (ref 7–30)
CALCIUM SERPL-MCNC: 8.1 MG/DL (ref 8.5–10.1)
CHLORIDE SERPL-SCNC: 107 MMOL/L (ref 94–109)
CO2 SERPL-SCNC: 27 MMOL/L (ref 20–32)
CREAT SERPL-MCNC: 0.87 MG/DL (ref 0.66–1.25)
GFR SERPL CREATININE-BSD FRML MDRD: >90 ML/MIN/{1.73_M2}
GLUCOSE SERPL-MCNC: 99 MG/DL (ref 70–99)
POTASSIUM SERPL-SCNC: 3.5 MMOL/L (ref 3.4–5.3)
SODIUM SERPL-SCNC: 139 MMOL/L (ref 133–144)

## 2019-06-12 PROCEDURE — 99232 SBSQ HOSP IP/OBS MODERATE 35: CPT | Performed by: SURGERY

## 2019-06-12 PROCEDURE — 12000000 ZZH R&B MED SURG/OB

## 2019-06-12 PROCEDURE — 36415 COLL VENOUS BLD VENIPUNCTURE: CPT | Performed by: PHYSICIAN ASSISTANT

## 2019-06-12 PROCEDURE — 25000125 ZZHC RX 250: Performed by: INTERNAL MEDICINE

## 2019-06-12 PROCEDURE — 25800030 ZZH RX IP 258 OP 636: Performed by: PHYSICIAN ASSISTANT

## 2019-06-12 PROCEDURE — 80048 BASIC METABOLIC PNL TOTAL CA: CPT | Performed by: PHYSICIAN ASSISTANT

## 2019-06-12 PROCEDURE — C9113 INJ PANTOPRAZOLE SODIUM, VIA: HCPCS | Performed by: PHYSICIAN ASSISTANT

## 2019-06-12 PROCEDURE — 74019 RADEX ABDOMEN 2 VIEWS: CPT

## 2019-06-12 PROCEDURE — 25000128 H RX IP 250 OP 636: Performed by: PHYSICIAN ASSISTANT

## 2019-06-12 PROCEDURE — 25000132 ZZH RX MED GY IP 250 OP 250 PS 637: Performed by: INTERNAL MEDICINE

## 2019-06-12 PROCEDURE — 99232 SBSQ HOSP IP/OBS MODERATE 35: CPT | Performed by: INTERNAL MEDICINE

## 2019-06-12 RX ORDER — OXYCODONE HYDROCHLORIDE 5 MG/1
5 TABLET ORAL EVERY 6 HOURS PRN
Status: DISCONTINUED | OUTPATIENT
Start: 2019-06-12 | End: 2019-06-16 | Stop reason: HOSPADM

## 2019-06-12 RX ORDER — DILTIAZEM HYDROCHLORIDE 180 MG/1
360 CAPSULE, COATED, EXTENDED RELEASE ORAL AT BEDTIME
Status: DISCONTINUED | OUTPATIENT
Start: 2019-06-12 | End: 2019-06-13

## 2019-06-12 RX ADMIN — DILTIAZEM HYDROCHLORIDE 360 MG: 180 CAPSULE, COATED, EXTENDED RELEASE ORAL at 21:56

## 2019-06-12 RX ADMIN — SODIUM CHLORIDE: 9 INJECTION, SOLUTION INTRAVENOUS at 05:24

## 2019-06-12 RX ADMIN — METOPROLOL TARTRATE 5 MG: 1 INJECTION, SOLUTION INTRAVENOUS at 08:33

## 2019-06-12 RX ADMIN — SODIUM CHLORIDE: 9 INJECTION, SOLUTION INTRAVENOUS at 15:27

## 2019-06-12 RX ADMIN — METOPROLOL TARTRATE 5 MG: 1 INJECTION, SOLUTION INTRAVENOUS at 14:16

## 2019-06-12 RX ADMIN — PANTOPRAZOLE SODIUM 40 MG: 40 INJECTION, POWDER, FOR SOLUTION INTRAVENOUS at 08:33

## 2019-06-12 RX ADMIN — METOPROLOL TARTRATE 5 MG: 1 INJECTION, SOLUTION INTRAVENOUS at 01:29

## 2019-06-12 ASSESSMENT — ACTIVITIES OF DAILY LIVING (ADL)
ADLS_ACUITY_SCORE: 10

## 2019-06-12 NOTE — PLAN OF CARE
Pt is NPO.  A&O x4.  Independent, abulated in hallway x1. Pt denies pain.  NS 100mL/hr.  VSS.  IV metoprolol.  Not tolerating NG.  ABD xray this am.  Possible discharge in 2+ days.  Continue plan of care.

## 2019-06-12 NOTE — PROGRESS NOTES
"[facility]  General Surgery Progress Note           Assessment and Plan:   Assessment:   SBO vs ileus - symptomatically improved      Plan:   -discussed surgery again  Discussed radiology placement of NG asa not successful on the floor last night  Discussed continuing on present course, possible water soluble SBFT tomorrow if not resolved  He prefers the latter option         Interval History:   Unable to tolerate NG placement, liquid watery stools x 3, some gas         Physical Exam:   Blood pressure 147/85, pulse 74, temperature 98.1  F (36.7  C), temperature source Oral, resp. rate 20, height 1.905 m (6' 3\"), weight (!) 158.8 kg (350 lb), SpO2 98 %.    I/O last 3 completed shifts:  In: 800 [I.V.:800]  Out: 200 [Urine:200]    Abdomen:   soft, distended, non-tender, voluntary guarding absent, no masses palpated and hernia present at umbilicus but not tender             Data:     Recent Labs   Lab 06/11/19  0546   WBC 8.4   HGB 15.4   HCT 46.3   MCV 86        Abdominal films:   Pattern consistent with SBO or ileus, minimal gas in colon       Isac Godoy MD     "

## 2019-06-12 NOTE — PLAN OF CARE
Pt is here for abd pain and SBO, Up IND. No complaints of pain this shift. Walked the halls a few times. Plan is to stay a couple more days to see how abd pain and SBO are progressing. NPO still. NS@100. Pt showered this morning. Will continue with plan of care.

## 2019-06-12 NOTE — PLAN OF CARE
Orientation: A/O x4  VS: VSS, IV metoprolol for B/P & HR  LS: Clear bilat on RA  GI: NPO, Pt no tolerating NG tube placement. Antinausea meds given PRN. IV dilaudid given PRN for abd pain. Pt reports having small liquid stools this shift  :Voids w/o difficulty  Skin: WDL  Activity: Independent in room and ambulating in halls  Pain: Controlled with heating pad & IV dilaudid   Lines: PIV  Plan: Continue IV fluids, control pain, monitor bowel function, encourage ambulation  Betty Leach RN on 6/11/2019 at 11:01 PM

## 2019-06-12 NOTE — PROGRESS NOTES
[facility]    Hospitalist Progress Note      Assessment & Plan   Triston Rausch is a 54 year old male who was admitted on 6/11/2019.    Summary of Stay:   Triston Rausch is a 54 year old male with PMH significant for paroxysmal A-fib, HTN, SONG not using CPAP, kidney stones, and HLD who presents to the ED for evaluation of abdominal pain. ED workup reveals VSS, CMP unremarkable, lipase WNL, glucose of 115, CBC WNL, and CT of abdomen/pelvis shows mid small bowel obstruction with bilateral renal stones and no ureteral stone or hydronephrosis.     Patient is being treated conservatively.  Being followed by general surgery.  Nasogastric tube was tried but patient did not tolerate that and pull it out.      Plan:    Small bowel obstruction  - General surgery consult appreciated.  - Overall feeling well.  Denies any nausea or vomiting.  Abdominal pain is significantly improved.  - Having BMs.    Paroxysmal A. fib  - Overall stable.  Currently getting IV metoprolol which can be transitioned to his home oral diltiazem, starting tonight.    Hypertension  -Resume oral diltiazem.    Obstructive sleep apnea  -Intolerant to CPAP.      DVT Prophylaxis: Pneumatic Compression Devices  Code Status: Full Code  Expected discharge: next 1-2 days    Lior Jenkins MD  [employer] (7am - 6pm, M-F)    Interval History   Patient was evaluated with nursing staff. Overnight issues discussed.  Feeling better.  Denies any significant abdominal pain.  Denies nausea or vomiting.  He has been having liquid BMs which seems to be slowing down.   Denies any chest pain, shortness of breath or palpitations.    -Data reviewed today: Labs and medications.    Physical Exam   Temp: 98.1  F (36.7  C) Temp src: Oral BP: 141/70 Pulse: 74 Heart Rate: 74 Resp: 20 SpO2: 98 % O2 Device: None (Room air)    Vitals:    06/11/19 0534   Weight: (!) 158.8 kg (350 lb)     Vital Signs with Ranges  Temp:  [97.7  F (36.5  C)-99  F (37.2  C)] 98.1  F (36.7   C)  Pulse:  [74-85] 74  Heart Rate:  [74-99] 74  Resp:  [16-20] 20  BP: (124-147)/(70-85) 141/70  SpO2:  [92 %-98 %] 98 %  I/O last 3 completed shifts:  In: 800 [I.V.:800]  Out: 200 [Urine:200]    Constitutional: Awake, alert, cooperative, no apparent distress  HEENT: Trachea midline, sclera is clear   Respiratory: Clear to auscultation bilaterally, no crackles or wheezing  Cardiovascular: Regular rate and rhythm, normal S1 and S2, and no murmur noted  GI: Normal bowel sounds, soft, non-distended, non-tender  Skin/Integumen: No rashes, no cyanosis, no edema  Psych: appropriate affect, no agitation   Extremities: No pitting edema     Medications     sodium chloride 100 mL/hr at 06/12/19 0524       metoprolol  5 mg Intravenous Q6H     pantoprazole (PROTONIX) IV  40 mg Intravenous Daily with breakfast       Data   Recent Labs   Lab 06/12/19  0946 06/11/19  0546   WBC  --  8.4   HGB  --  15.4   MCV  --  86   PLT  --  215    135   POTASSIUM 3.5 3.4   CHLORIDE 107 103   CO2 27 26   BUN 15 18   CR 0.87 1.12   ANIONGAP 5 6   CARIDAD 8.1* 8.2*   GLC 99 115*   ALBUMIN  --  3.8   PROTTOTAL  --  8.4   BILITOTAL  --  0.7   ALKPHOS  --  130   ALT  --  34   AST  --  25   LIPASE  --  252       No results found for this or any previous visit (from the past 24 hour(s)).

## 2019-06-13 ENCOUNTER — APPOINTMENT (OUTPATIENT)
Dept: GENERAL RADIOLOGY | Facility: CLINIC | Age: 54
End: 2019-06-13
Attending: SURGERY
Payer: COMMERCIAL

## 2019-06-13 LAB
BASOPHILS # BLD AUTO: 0 10E9/L (ref 0–0.2)
BASOPHILS NFR BLD AUTO: 0.2 %
DIFFERENTIAL METHOD BLD: ABNORMAL
EOSINOPHIL # BLD AUTO: 0.1 10E9/L (ref 0–0.7)
EOSINOPHIL NFR BLD AUTO: 3 %
ERYTHROCYTE [DISTWIDTH] IN BLOOD BY AUTOMATED COUNT: 12.7 % (ref 10–15)
HCT VFR BLD AUTO: 37.6 % (ref 40–53)
HGB BLD-MCNC: 12.3 G/DL (ref 13.3–17.7)
IMM GRANULOCYTES # BLD: 0 10E9/L (ref 0–0.4)
IMM GRANULOCYTES NFR BLD: 0.2 %
LYMPHOCYTES # BLD AUTO: 1.4 10E9/L (ref 0.8–5.3)
LYMPHOCYTES NFR BLD AUTO: 35.7 %
MCH RBC QN AUTO: 29 PG (ref 26.5–33)
MCHC RBC AUTO-ENTMCNC: 32.7 G/DL (ref 31.5–36.5)
MCV RBC AUTO: 89 FL (ref 78–100)
MONOCYTES # BLD AUTO: 0.5 10E9/L (ref 0–1.3)
MONOCYTES NFR BLD AUTO: 12.7 %
NEUTROPHILS # BLD AUTO: 1.9 10E9/L (ref 1.6–8.3)
NEUTROPHILS NFR BLD AUTO: 48.2 %
NRBC # BLD AUTO: 0 10*3/UL
NRBC BLD AUTO-RTO: 0 /100
PLATELET # BLD AUTO: 187 10E9/L (ref 150–450)
RBC # BLD AUTO: 4.24 10E12/L (ref 4.4–5.9)
WBC # BLD AUTO: 4 10E9/L (ref 4–11)

## 2019-06-13 PROCEDURE — 99232 SBSQ HOSP IP/OBS MODERATE 35: CPT | Performed by: INTERNAL MEDICINE

## 2019-06-13 PROCEDURE — 99231 SBSQ HOSP IP/OBS SF/LOW 25: CPT | Performed by: SURGERY

## 2019-06-13 PROCEDURE — 25000132 ZZH RX MED GY IP 250 OP 250 PS 637: Performed by: PHYSICIAN ASSISTANT

## 2019-06-13 PROCEDURE — 25800030 ZZH RX IP 258 OP 636: Performed by: PHYSICIAN ASSISTANT

## 2019-06-13 PROCEDURE — 85025 COMPLETE CBC W/AUTO DIFF WBC: CPT | Performed by: SURGERY

## 2019-06-13 PROCEDURE — 74019 RADEX ABDOMEN 2 VIEWS: CPT

## 2019-06-13 PROCEDURE — C9113 INJ PANTOPRAZOLE SODIUM, VIA: HCPCS | Performed by: PHYSICIAN ASSISTANT

## 2019-06-13 PROCEDURE — 12000000 ZZH R&B MED SURG/OB

## 2019-06-13 PROCEDURE — 25000132 ZZH RX MED GY IP 250 OP 250 PS 637: Performed by: INTERNAL MEDICINE

## 2019-06-13 PROCEDURE — 36415 COLL VENOUS BLD VENIPUNCTURE: CPT | Performed by: SURGERY

## 2019-06-13 PROCEDURE — 25000128 H RX IP 250 OP 636: Performed by: PHYSICIAN ASSISTANT

## 2019-06-13 RX ORDER — DILTIAZEM HYDROCHLORIDE 180 MG/1
360 CAPSULE, COATED, EXTENDED RELEASE ORAL EVERY EVENING
Status: DISCONTINUED | OUTPATIENT
Start: 2019-06-13 | End: 2019-06-16 | Stop reason: HOSPADM

## 2019-06-13 RX ADMIN — ACETAMINOPHEN 650 MG: 325 TABLET, FILM COATED ORAL at 12:29

## 2019-06-13 RX ADMIN — DILTIAZEM HYDROCHLORIDE 360 MG: 180 CAPSULE, COATED, EXTENDED RELEASE ORAL at 17:49

## 2019-06-13 RX ADMIN — OXYCODONE HYDROCHLORIDE 5 MG: 5 TABLET ORAL at 23:17

## 2019-06-13 RX ADMIN — SODIUM CHLORIDE: 9 INJECTION, SOLUTION INTRAVENOUS at 01:00

## 2019-06-13 RX ADMIN — ACETAMINOPHEN 650 MG: 325 TABLET, FILM COATED ORAL at 22:15

## 2019-06-13 RX ADMIN — SODIUM CHLORIDE: 9 INJECTION, SOLUTION INTRAVENOUS at 23:12

## 2019-06-13 RX ADMIN — SODIUM CHLORIDE: 9 INJECTION, SOLUTION INTRAVENOUS at 13:38

## 2019-06-13 RX ADMIN — PANTOPRAZOLE SODIUM 40 MG: 40 INJECTION, POWDER, FOR SOLUTION INTRAVENOUS at 09:40

## 2019-06-13 ASSESSMENT — ACTIVITIES OF DAILY LIVING (ADL)
ADLS_ACUITY_SCORE: 10

## 2019-06-13 NOTE — PLAN OF CARE
Pt A&Ox4, up indp ambulating in [place], , pt doing slow start to clears and tolerating well, pt passing gas, c/o hunger pains, surgery following, pt makes needs known, will continue POC.

## 2019-06-13 NOTE — PLAN OF CARE
Orientation: A/O x4  VS:VSS, afebrile  LS: Clear bilat  GI: NPO w/ sips of water and oral meds. Pt reports passing loose/watery stools frequently during AM, as well as very small pebble stools  : Voiding w/o difficulty  Skin:WDL  Activity: Ambulating independently in the halls  Pain: Pt declines pain. States improved discomfort  Lines: PIV R arm  Plan: IV fluid, ambulate as tolerated, continue monitoring pain level, monitor BM's, Xray tmrw to assess improvement  Betty Leach RN on 6/12/2019 at 8:47 PM

## 2019-06-13 NOTE — PLAN OF CARE
Pt is A&O x4.  NPO, can have sips of water and with meds.  Abd x-ray this am.  Independent.  Denies pain.  VSS.  NS 100mL/hr.  Possible discharge 1-2 days.  Continue plan of care.

## 2019-06-13 NOTE — PROGRESS NOTES
[facility]    Hospitalist Progress Note      Assessment & Plan   Triston Rausch is a 54 year old male who was admitted on 6/11/2019.    Summary of Stay:   Triston Rausch is a 54 year old male with PMH significant for paroxysmal A-fib, HTN, SONG not using CPAP, kidney stones, and HLD who presents to the ED for evaluation of abdominal pain. ED workup reveals VSS, CMP unremarkable, lipase WNL, glucose of 115, CBC WNL, and CT of abdomen/pelvis shows mid small bowel obstruction with bilateral renal stones and no ureteral stone or hydronephrosis.      Patient is being treated conservatively.  Overall he is feeling better.  Being followed by general surgery.    Plan:    Small bowel obstruction  - General surgery consult appreciated.  - Overall feeling better. Xray shows improving bowel shadow.  - Advance diet per surgery recommendations.    Paroxysmal A. fib  - Stable on home dose of oral diltiazem.    Hypertension  - On oral home diltiazem.      DVT Prophylaxis: Pneumatic Compression Devices  Code Status: Full Code  Expected discharge: Tomorrow, if ok with surgery    Lior Jenkins MD  [employer] (7am - 6pm, M-F)    Interval History   Patient was evaluated with nursing staff. Overnight issues discussed.      -Data reviewed today: Labs and medications.    Physical Exam   Temp: 96.3  F (35.7  C) Temp src: Oral BP: 159/77 Pulse: 64 Heart Rate: 71 Resp: 16 SpO2: 95 % O2 Device: None (Room air)    Vitals:    06/11/19 0534   Weight: (!) 158.8 kg (350 lb)     Vital Signs with Ranges  Temp:  [96.3  F (35.7  C)-97.3  F (36.3  C)] 96.3  F (35.7  C)  Pulse:  [64] 64  Heart Rate:  [71-95] 71  Resp:  [16-18] 16  BP: (139-159)/(71-77) 159/77  SpO2:  [94 %-95 %] 95 %  I/O last 3 completed shifts:  In: -   Out: 400 [Urine:400]    Constitutional: Awake, alert, cooperative, no apparent distress  HEENT: Trachea midline, sclera is clear   Respiratory: Clear to auscultation bilaterally, no crackles or  wheezing  Cardiovascular: Regular rate and rhythm, normal S1 and S2, and no murmur noted  GI: Normal bowel sounds, soft, non-distended, non-tender  Skin/Integumen: No rashes, no cyanosis    Medications     sodium chloride 100 mL/hr at 06/13/19 1338       diltiazem ER COATED BEADS  360 mg Oral QPM     pantoprazole (PROTONIX) IV  40 mg Intravenous Daily with breakfast       Data   Recent Labs   Lab 06/13/19  0635 06/12/19  0946 06/11/19  0546   WBC 4.0  --  8.4   HGB 12.3*  --  15.4   MCV 89  --  86     --  215   NA  --  139 135   POTASSIUM  --  3.5 3.4   CHLORIDE  --  107 103   CO2  --  27 26   BUN  --  15 18   CR  --  0.87 1.12   ANIONGAP  --  5 6   CARIDAD  --  8.1* 8.2*   GLC  --  99 115*   ALBUMIN  --   --  3.8   PROTTOTAL  --   --  8.4   BILITOTAL  --   --  0.7   ALKPHOS  --   --  130   ALT  --   --  34   AST  --   --  25   LIPASE  --   --  252       Recent Results (from the past 24 hour(s))   XR Abdomen 2 Views    Narrative    XR ABDOMEN 2 VW 6/13/2019 8:38 AM    COMPARISON: 6/12/2019    HISTORY: Small bowel obstruction versus ileus.      Impression    IMPRESSION: Gas-filled loops of distended small bowel significantly  decreased in size and number since yesterday's study. No evidence of  free air.    KARL GILL MD

## 2019-06-13 NOTE — PROGRESS NOTES
"[facility]  General Surgery Progress Note           Assessment and Plan:   Assessment:   SBO vs ileus - symptomatically improving      Plan:   -Continue NPO and ice chips, sip of fluids with meds; possible start of clear liquids later    -Await radiology read     1228 addendum: Radiology read also as improved.  Will start clear liquid diet, small meals, slow rate for 2 hours then increase rate of intake as tolerated.  Melanie Harris PA-C        Interval History:   Feels that bloating improving.  Still some sensitivity at epigastrium but better.  Passing some flatus and minimal BM; lessened compared to yesterday.  No need for pain meds.         Physical Exam:   Blood pressure 159/77, pulse 64, temperature 96.3  F (35.7  C), temperature source Oral, resp. rate 16, height 1.905 m (6' 3\"), weight (!) 158.8 kg (350 lb), SpO2 95 %.    Abdomen:   soft, distended, non-tender but \"full\" at epigastrium, hernia present at umbilicus but not tender             Data:     Recent Labs   Lab 06/13/19  0635 06/11/19  0546   WBC 4.0 8.4   HGB 12.3* 15.4   HCT 37.6* 46.3   MCV 89 86    215     6/13 Abdominal films: my read   Fewer distended loops, improved     Melanie Harris PA-C       As above, AXR substantially improved  Felt great this AM and hungry but after apple juice - felt \"woozy\" and slightly nauseated but no pain  Passing some gas and stool  Will wait on advancing diet  Isac Godoy MD  6/13/2019 3:38 PM    "

## 2019-06-14 PROCEDURE — 99232 SBSQ HOSP IP/OBS MODERATE 35: CPT | Performed by: INTERNAL MEDICINE

## 2019-06-14 PROCEDURE — 25000132 ZZH RX MED GY IP 250 OP 250 PS 637: Performed by: INTERNAL MEDICINE

## 2019-06-14 PROCEDURE — C9113 INJ PANTOPRAZOLE SODIUM, VIA: HCPCS | Performed by: PHYSICIAN ASSISTANT

## 2019-06-14 PROCEDURE — 25000128 H RX IP 250 OP 636: Performed by: PHYSICIAN ASSISTANT

## 2019-06-14 PROCEDURE — 25000132 ZZH RX MED GY IP 250 OP 250 PS 637: Performed by: PHYSICIAN ASSISTANT

## 2019-06-14 PROCEDURE — 99231 SBSQ HOSP IP/OBS SF/LOW 25: CPT | Performed by: PHYSICIAN ASSISTANT

## 2019-06-14 PROCEDURE — 12000000 ZZH R&B MED SURG/OB

## 2019-06-14 RX ORDER — LOSARTAN POTASSIUM 25 MG/1
25 TABLET ORAL DAILY
Status: DISCONTINUED | OUTPATIENT
Start: 2019-06-14 | End: 2019-06-16 | Stop reason: HOSPADM

## 2019-06-14 RX ADMIN — ACETAMINOPHEN 650 MG: 325 TABLET, FILM COATED ORAL at 21:04

## 2019-06-14 RX ADMIN — DILTIAZEM HYDROCHLORIDE 360 MG: 180 CAPSULE, COATED, EXTENDED RELEASE ORAL at 17:57

## 2019-06-14 RX ADMIN — PANTOPRAZOLE SODIUM 40 MG: 40 INJECTION, POWDER, FOR SOLUTION INTRAVENOUS at 08:57

## 2019-06-14 RX ADMIN — ACETAMINOPHEN 650 MG: 325 TABLET, FILM COATED ORAL at 15:22

## 2019-06-14 RX ADMIN — LOSARTAN POTASSIUM 25 MG: 25 TABLET, FILM COATED ORAL at 15:22

## 2019-06-14 ASSESSMENT — ACTIVITIES OF DAILY LIVING (ADL)
ADLS_ACUITY_SCORE: 10

## 2019-06-14 NOTE — PLAN OF CARE
Pt is independent up 1x walked the halls.  Pt A&O x4 had pain in kidneys then voided 900mL and pt stated the pain went away.  General surgery is following.  Abd xray shows SBO getting better.  NS 100mL/hr.  Clear liquid diet.  VSS.  Possible discharge today if general surgery approves.  Continue plan of care.

## 2019-06-14 NOTE — PLAN OF CARE
ORIENTATION: A&O  VS: elevated BPs managed w/scheduled BP meds  LUNGS: 96% RA; Clear  : WDL  GI: Last BM 6/12/19; no c/o pain  IV: NS 100mL/hr   PAIN: Back pain 3/10; prn tylenol x 1  ACTIVITY: Independent  DIET: Clear liquids  PLAN: possible discharge tomorrow; continue w/poc

## 2019-06-14 NOTE — PLAN OF CARE
"VSS on RA. Pt denies Abd pain. Independent. Full liquid diet, tolerating well, advanced to regular diet. C/o \"Kidney pain\" low back pain, Tylenol given x1.  Ambulating in the gallardo independently. Gen Surgery following. Plan to discharge in the am if tolerating diet. Continue plan of care for now.   "

## 2019-06-14 NOTE — PROGRESS NOTES
"[facility]  General Surgery Progress Note         Assessment and Plan:   Assessment:   SBO vs ileus - resolving      Plan:   -Full liquid diet, 6 small meals, advance to low-residue diet later today  -Increase activity as tolerated  -Hospitalist managing medical issues  -Disposition: From surgical standpoint could potentially discharge tonight vs tomorrow morning if tolerates diet advancement and continues to have bowel activity.  -Discharge instructions in chart. Recommend low-res diet for 1-2 weeks after discharge.         Interval History:   Feels better today. Very hungry. Tolerating clears just fine. Had another loose BM this morning. Passing lots of flatus. Up walking. Voiding.         Physical Exam:   Blood pressure 148/74, pulse 64, temperature 96  F (35.6  C), temperature source Oral, resp. rate 16, height 1.905 m (6' 3\"), weight (!) 158.8 kg (350 lb), SpO2 93 %.    Abdomen:   soft, non-tender, hernia present at umbilicus but not tender             Data:     Recent Labs   Lab 06/13/19  0635 06/11/19  0546   WBC 4.0 8.4   HGB 12.3* 15.4   HCT 37.6* 46.3   MCV 89 86    215     6/13 Abdominal films:    IMPRESSION: Gas-filled loops of distended small bowel significantly  decreased in size and number since yesterday's study. No evidence of  free air.       Kwabena Terry PA-C     Seen and agree,    Axel Rodriguez MD  [facility]        "

## 2019-06-14 NOTE — DISCHARGE INSTRUCTIONS
"HOME CARE FOLLOWING BOWEL OBSTRUCTION ADMISSION  LEANDRO Suárez, GENARO Javier, TESSA Valle, SAGAR Jones    ACTIVITY:  Light Activity -- you may immediately be up and about as tolerated.  Driving -- you may drive when comfortable and off narcotic pain medications.  Light Work -- resume when comfortable off pain medications.  (If you can drive, you probably can work.)  Strenuous Work/Activity -- limit lifting to 15 pounds for 1-2 weeks.  Then, progressively increase with time.  Active Sports (running, biking, etc.) -- cautiously resume after 4 weeks, or when cleared by your surgeon.    DISCOMFORT:  Expect gradual improvement of residual abdominal soreness as your bowel function continues to return to normal over the following 1-2 weeks.  Please contact the office if you have worsening of abdominal pain, or onset of nausea/vomiting.    DIET:  Continue on a \"soft\" diet (i.e. cooked vegetables, soups, processed meats, light/white bread, mashed potatoes, rice, yogurt) for the first one to two week after discharge from the hospital.  After this time, you may return to diet you were on before surgery minimizing high cellulose foods and foods with \"skins\" (i.e.grapes, apple, citrus, corn) only.  This would include limiting: brussel sprouts, cabbage & kale, alfalfa sprouts, zucchini, squash, potatoes, carrots, and yams.  Drink plenty of fluids.    SURGEON CONTACT/APPOINTMENTS:  Office Phone:  [phone]     CONTACT US IF THE FOLLOWING DEVELOPS:   1. A fever that is above 101     2. Severe pain that is not relieved by your prescription.        If you have other questions, please call the office Monday thru Friday between 8am and 5pm to discuss with the nurse or physician assistant.  #[phone]    There is a surgeon ON CALL on weekday evenings and over the weekend in case of urgent need only, and may be contacted at the same number.    If you are having an emergency, call 911 or proceed to your " nearest emergency department.

## 2019-06-14 NOTE — PROGRESS NOTES
[facility]    Hospitalist Progress Note      Assessment & Plan   Triston Rausch is a 54 year old male who was admitted on 6/11/2019.    Summary of Stay:   Triston Rausch is a 54 year old male with PMH significant for paroxysmal A-fib, HTN, SONG not using CPAP, kidney stones, and HLD who presents to the ED for evaluation of abdominal pain. ED workup reveals VSS, CMP unremarkable, lipase WNL, glucose of 115, CBC WNL, and CT of abdomen/pelvis shows mid small bowel obstruction with bilateral renal stones and no ureteral stone or hydronephrosis.      Patient is being treated conservatively.     Patient's diet is slowly being advanced.  Today he is on clear liquid diet which will slowly be advanced throughout the day.  We will monitor him overnight after the advancement of diet.  If remains stable then discharge tomorrow morning.    Plan:    Small bowel obstruction  -General surgery consult appreciated.  -Improvement.  Slowly advance the diet as per general surgery recommendations.  If no issues then discharge tomorrow.    Paroxysmal A. fib  -Stable on oral diltiazem.    Hypertension  - On diltiazem.  Restart oral losartan.      DVT Prophylaxis: Pneumatic Compression Devices.    Code Status: Full Code  Expected discharge: Tomorrow,   Lior Jenkins MD  Text Page (7am - 6pm, M-F)    Interval History   Patient was evaluated with nursing staff. Overnight issues discussed.  Feeling better.  On clear liquid diet.  Diet is being advanced slowly.  Having BMs and passing flatus.  Denies any vomiting.  Abdominal pain is improved.    -Data reviewed today: Labs and medications.    Physical Exam   Temp: 96  F (35.6  C) Temp src: Oral BP: 148/74   Heart Rate: 60 Resp: 16 SpO2: 93 % O2 Device: None (Room air)    Vitals:    06/11/19 0534   Weight: (!) 158.8 kg (350 lb)     Vital Signs with Ranges  Temp:  [95.3  F (35.2  C)-96  F (35.6  C)] 96  F (35.6  C)  Heart Rate:  [54-62] 60  Resp:  [16] 16  BP: (133-171)/(68-79)  148/74  SpO2:  [93 %-96 %] 93 %  I/O last 3 completed shifts:  In: -   Out: 2150 [Urine:2150]    Constitutional: Awake, alert, cooperative, no apparent distress  HEENT: Trachea midline, sclera is clear   Respiratory: Clear to auscultation bilaterally, no crackles or wheezing  Cardiovascular: Regular rate and rhythm, normal S1 and S2, and no murmur noted  GI: Normal bowel sounds, soft, non-distended, non-tender  Skin/Integumen: No rashes, no cyanosis, no edema  Psych: appropriate affect, no agitation   Extremities: No pitting edema     Medications     sodium chloride Stopped (06/14/19 0934)       diltiazem ER COATED BEADS  360 mg Oral QPM     pantoprazole (PROTONIX) IV  40 mg Intravenous Daily with breakfast       Data   Recent Labs   Lab 06/13/19  0635 06/12/19  0946 06/11/19  0546   WBC 4.0  --  8.4   HGB 12.3*  --  15.4   MCV 89  --  86     --  215   NA  --  139 135   POTASSIUM  --  3.5 3.4   CHLORIDE  --  107 103   CO2  --  27 26   BUN  --  15 18   CR  --  0.87 1.12   ANIONGAP  --  5 6   CARIDAD  --  8.1* 8.2*   GLC  --  99 115*   ALBUMIN  --   --  3.8   PROTTOTAL  --   --  8.4   BILITOTAL  --   --  0.7   ALKPHOS  --   --  130   ALT  --   --  34   AST  --   --  25   LIPASE  --   --  252       No results found for this or any previous visit (from the past 24 hour(s)).

## 2019-06-15 ENCOUNTER — APPOINTMENT (OUTPATIENT)
Dept: GENERAL RADIOLOGY | Facility: CLINIC | Age: 54
End: 2019-06-15
Attending: PHYSICIAN ASSISTANT
Payer: COMMERCIAL

## 2019-06-15 ENCOUNTER — APPOINTMENT (OUTPATIENT)
Dept: CT IMAGING | Facility: CLINIC | Age: 54
End: 2019-06-15
Attending: SURGERY
Payer: COMMERCIAL

## 2019-06-15 PROCEDURE — 25000128 H RX IP 250 OP 636: Performed by: INTERNAL MEDICINE

## 2019-06-15 PROCEDURE — 12000000 ZZH R&B MED SURG/OB

## 2019-06-15 PROCEDURE — 25000128 H RX IP 250 OP 636: Performed by: PHYSICIAN ASSISTANT

## 2019-06-15 PROCEDURE — 74177 CT ABD & PELVIS W/CONTRAST: CPT

## 2019-06-15 PROCEDURE — C9113 INJ PANTOPRAZOLE SODIUM, VIA: HCPCS | Performed by: PHYSICIAN ASSISTANT

## 2019-06-15 PROCEDURE — 25000132 ZZH RX MED GY IP 250 OP 250 PS 637: Performed by: PHYSICIAN ASSISTANT

## 2019-06-15 PROCEDURE — 25000132 ZZH RX MED GY IP 250 OP 250 PS 637: Performed by: INTERNAL MEDICINE

## 2019-06-15 PROCEDURE — 74019 RADEX ABDOMEN 2 VIEWS: CPT

## 2019-06-15 PROCEDURE — 99231 SBSQ HOSP IP/OBS SF/LOW 25: CPT | Performed by: SURGERY

## 2019-06-15 PROCEDURE — 99232 SBSQ HOSP IP/OBS MODERATE 35: CPT | Performed by: INTERNAL MEDICINE

## 2019-06-15 RX ORDER — PANTOPRAZOLE SODIUM 40 MG/1
40 TABLET, DELAYED RELEASE ORAL
Status: DISCONTINUED | OUTPATIENT
Start: 2019-06-16 | End: 2019-06-16 | Stop reason: HOSPADM

## 2019-06-15 RX ORDER — IOPAMIDOL 755 MG/ML
500 INJECTION, SOLUTION INTRAVASCULAR ONCE
Status: COMPLETED | OUTPATIENT
Start: 2019-06-15 | End: 2019-06-15

## 2019-06-15 RX ADMIN — SODIUM CHLORIDE 65 ML: 9 INJECTION, SOLUTION INTRAVENOUS at 15:48

## 2019-06-15 RX ADMIN — ACETAMINOPHEN 650 MG: 325 TABLET, FILM COATED ORAL at 21:33

## 2019-06-15 RX ADMIN — IOPAMIDOL 100 ML: 755 INJECTION, SOLUTION INTRAVENOUS at 15:48

## 2019-06-15 RX ADMIN — DILTIAZEM HYDROCHLORIDE 360 MG: 180 CAPSULE, COATED, EXTENDED RELEASE ORAL at 17:38

## 2019-06-15 RX ADMIN — ACETAMINOPHEN 650 MG: 325 TABLET, FILM COATED ORAL at 17:38

## 2019-06-15 RX ADMIN — PANTOPRAZOLE SODIUM 40 MG: 40 INJECTION, POWDER, FOR SOLUTION INTRAVENOUS at 09:15

## 2019-06-15 RX ADMIN — LOSARTAN POTASSIUM 25 MG: 25 TABLET, FILM COATED ORAL at 09:15

## 2019-06-15 ASSESSMENT — ACTIVITIES OF DAILY LIVING (ADL)
ADLS_ACUITY_SCORE: 10

## 2019-06-15 NOTE — PROGRESS NOTES
Bowel obstruction resolved by CT.  Okay to advance diet as tolerated.  Etiology of previous obstruction is unclear.  Nina Varela MD

## 2019-06-15 NOTE — PLAN OF CARE
Please see flowsheets for detailed vital signs and assessments.   Neuro: A&O  Vital Signs: stable  Pain: c/o back pain, gave APAP and pt rested quietly  O2: mid 90s RA  Tele: NA  Lungs: WDL  GI: c/o mild abdominal discomfort--pt with hypoactive BS not passing flatus. Denies nausea.  : voiding w/o difficulty    Skin: 1+ edema BLE, intact  Activity: independent in room  IVF: saline locked  Notable labs: NA  Protocols: NA  Consults: General surgery  Plan: IV protonix, pain management, dilt  Discharge: 1-2 days

## 2019-06-15 NOTE — PLAN OF CARE
VSS on RA. A/O. Independent. BM this shift, BS hypoactive. Tolerating clear liquids, anxious about trying full liquids. Abd CT performed. Pt c/o low back pain, Tylenol given, see MAR. Gen Surg following. Discharge plan 2-3 days.

## 2019-06-15 NOTE — PROGRESS NOTES
"[facility]  General Surgery Progress Note         Assessment and Plan:   Assessment:   SBO vs ileus - resolving      Plan:   -Full liquid diet, 6 small meals, advance to low-residue diet later today  -Will check ABD Xray   -Increase activity as tolerated  -Hospitalist managing medical issues  -Disposition: From surgical standpoint could potentially discharge today if tolerates diet advancement and continues to have bowel activity.  -Discharge instructions in chart. Recommend low-res diet for 1-2 weeks after discharge.         Interval History:   Today he is worried that he is getting \"backed up.\" He did fine with clear liquids but had some nausea with full liquids. Felt bloated and stopped passing flatus. He is hesitant to advance diet. Up walking. Voiding.         Physical Exam:   Blood pressure 137/65, pulse 64, temperature 97.1  F (36.2  C), temperature source Oral, resp. rate 16, height 1.905 m (6' 3\"), weight (!) 158.8 kg (350 lb), SpO2 94 %.    Abdomen:   soft, non-tender, hernia present at umbilicus but not tender             Data:     Recent Labs   Lab 06/13/19  0635 06/11/19  0546   WBC 4.0 8.4   HGB 12.3* 15.4   HCT 37.6* 46.3   MCV 89 86    215     6/13 Abdominal films:    IMPRESSION: Gas-filled loops of distended small bowel significantly  decreased in size and number since yesterday's study. No evidence of  free air.       Kwabena Terry PA-C     He feels full and bloated again.  No BM and little to no flatus.  His plain films are nearly uninterruptable due to body size.  Will re-check CT with ORAL contrast.  Nina Varela MD          "

## 2019-06-15 NOTE — PROGRESS NOTES
[facility]    Hospitalist Progress Note      Assessment & Plan   Triston Rausch is a 54 year old male who was admitted on 6/11/2019.    Summary of Stay:   Triston Rausch is a 54 year old male with PMH significant for paroxysmal A-fib, HTN, SONG not using CPAP, kidney stones, and HLD who presents to the ED for evaluation of abdominal pain. ED workup reveals VSS, CMP unremarkable, lipase WNL, glucose of 115, CBC WNL, and CT of abdomen/pelvis shows mid small bowel obstruction with bilateral renal stones and no ureteral stone or hydronephrosis.      Patient is being treated conservatively.      Patient's diet is slowly being advanced.  Last evening, after having some eggs he felt very bloated.  He was very afraid and did not eat or drink anything throughout the night.    Plan:    Small bowel obstruction  - General surgery consult appreciated.  Further diet changes per general surgery.  - Slow improvement.  Feels more bloated today.  Exam is unremarkable without tenderness.    Paroxysmal A. fib  -Continue oral diltiazem.    Hypertension  -Diltiazem and losartan.      DVT Prophylaxis: Pneumatic Compression Devices  Code Status: Full Code  Expected discharge: 2 - 3 days, per general surgery recommendations.    Lior Jenkins MD  Text Page (7am - 6pm, M-F)    Interval History   Patient was evaluated with nursing staff. Overnight issues discussed.  Overnight issues discussed.  Patient feels rather anxious about having the bloating sensation after eating.    -Data reviewed today: Labs and medications.    Physical Exam   Temp: 96.3  F (35.7  C) Temp src: Oral BP: 160/85   Heart Rate: 70 Resp: 16 SpO2: 95 % O2 Device: None (Room air)    Vitals:    06/11/19 0534   Weight: (!) 158.8 kg (350 lb)     Vital Signs with Ranges  Temp:  [95.3  F (35.2  C)-97.1  F (36.2  C)] 96.3  F (35.7  C)  Heart Rate:  [55-70] 70  Resp:  [16] 16  BP: (135-160)/(65-85) 160/85  SpO2:  [93 %-95 %] 95 %  No intake/output data  recorded.    Constitutional: Awake, alert, cooperative, no apparent distress  HEENT: Trachea midline, sclera is clear   Respiratory: Clear to auscultation bilaterally, no crackles or wheezing  Cardiovascular: Regular rate and rhythm, normal S1 and S2, and no murmur noted  GI: Normal bowel sounds, soft, non-distended, non-tender  Skin/Integumen: No rashes, no cyanosis, no edema  Psych: appropriate affect, no agitation   Extremities: No pitting edema     Medications     sodium chloride Stopped (06/14/19 0934)       diltiazem ER COATED BEADS  360 mg Oral QPM     losartan  25 mg Oral Daily     [START ON 6/16/2019] pantoprazole  40 mg Oral QAM AC       Data   Recent Labs   Lab 06/13/19  0635 06/12/19  0946 06/11/19  0546   WBC 4.0  --  8.4   HGB 12.3*  --  15.4   MCV 89  --  86     --  215   NA  --  139 135   POTASSIUM  --  3.5 3.4   CHLORIDE  --  107 103   CO2  --  27 26   BUN  --  15 18   CR  --  0.87 1.12   ANIONGAP  --  5 6   CARIDAD  --  8.1* 8.2*   GLC  --  99 115*   ALBUMIN  --   --  3.8   PROTTOTAL  --   --  8.4   BILITOTAL  --   --  0.7   ALKPHOS  --   --  130   ALT  --   --  34   AST  --   --  25   LIPASE  --   --  252       Recent Results (from the past 24 hour(s))   XR Abdomen 2 Views    Narrative    ABDOMEN TWO VIEWS 6/15/2019 8:45 AM     HISTORY: Small bowel obstruction follow-up.    COMPARISON: 6/13/2019.      Impression    IMPRESSION: Gas-filled loop of small bowel in the right upper quadrant  has decreased in prominence compared to the previous exam. Bowel gas  pattern is otherwise within normal limits. No free intraperitoneal air  is identified.    CHIP DUCKWORTH MD

## 2019-06-16 VITALS
TEMPERATURE: 97.6 F | WEIGHT: 315 LBS | HEIGHT: 75 IN | SYSTOLIC BLOOD PRESSURE: 129 MMHG | BODY MASS INDEX: 39.17 KG/M2 | RESPIRATION RATE: 18 BRPM | OXYGEN SATURATION: 95 % | HEART RATE: 64 BPM | DIASTOLIC BLOOD PRESSURE: 73 MMHG

## 2019-06-16 LAB
ALBUMIN SERPL-MCNC: 3.5 G/DL (ref 3.4–5)
ANION GAP SERPL CALCULATED.3IONS-SCNC: 7 MMOL/L (ref 3–14)
BUN SERPL-MCNC: 4 MG/DL (ref 7–30)
CALCIUM SERPL-MCNC: 8.5 MG/DL (ref 8.5–10.1)
CHLORIDE SERPL-SCNC: 105 MMOL/L (ref 94–109)
CO2 SERPL-SCNC: 29 MMOL/L (ref 20–32)
CREAT SERPL-MCNC: 0.75 MG/DL (ref 0.66–1.25)
GFR SERPL CREATININE-BSD FRML MDRD: >90 ML/MIN/{1.73_M2}
GLUCOSE SERPL-MCNC: 92 MG/DL (ref 70–99)
PHOSPHATE SERPL-MCNC: 3.4 MG/DL (ref 2.5–4.5)
POTASSIUM SERPL-SCNC: 3.2 MMOL/L (ref 3.4–5.3)
SODIUM SERPL-SCNC: 141 MMOL/L (ref 133–144)

## 2019-06-16 PROCEDURE — 25000132 ZZH RX MED GY IP 250 OP 250 PS 637: Performed by: INTERNAL MEDICINE

## 2019-06-16 PROCEDURE — 99231 SBSQ HOSP IP/OBS SF/LOW 25: CPT | Performed by: PHYSICIAN ASSISTANT

## 2019-06-16 PROCEDURE — 99239 HOSP IP/OBS DSCHRG MGMT >30: CPT | Performed by: INTERNAL MEDICINE

## 2019-06-16 PROCEDURE — 36415 COLL VENOUS BLD VENIPUNCTURE: CPT | Performed by: INTERNAL MEDICINE

## 2019-06-16 PROCEDURE — 80069 RENAL FUNCTION PANEL: CPT | Performed by: INTERNAL MEDICINE

## 2019-06-16 PROCEDURE — 25000132 ZZH RX MED GY IP 250 OP 250 PS 637: Performed by: PHYSICIAN ASSISTANT

## 2019-06-16 RX ORDER — POTASSIUM CHLORIDE 1500 MG/1
40 TABLET, EXTENDED RELEASE ORAL ONCE
Status: COMPLETED | OUTPATIENT
Start: 2019-06-16 | End: 2019-06-16

## 2019-06-16 RX ADMIN — ACETAMINOPHEN 650 MG: 325 TABLET, FILM COATED ORAL at 06:13

## 2019-06-16 RX ADMIN — LOSARTAN POTASSIUM 25 MG: 25 TABLET, FILM COATED ORAL at 08:29

## 2019-06-16 RX ADMIN — PANTOPRAZOLE SODIUM 40 MG: 40 TABLET, DELAYED RELEASE ORAL at 06:13

## 2019-06-16 RX ADMIN — POTASSIUM CHLORIDE 40 MEQ: 1500 TABLET, EXTENDED RELEASE ORAL at 11:50

## 2019-06-16 ASSESSMENT — ACTIVITIES OF DAILY LIVING (ADL): ADLS_ACUITY_SCORE: 10

## 2019-06-16 NOTE — DISCHARGE SUMMARY
[facility]    Discharge Summary  Hospitalist    Date of Admission:  6/11/2019  Date of Discharge:  6/16/2019  Discharging Provider: Lior Jenkins MD  Date of Service (when I saw the patient): 06/16/19    Discharge Diagnoses   Small bowel obstruction.  Treated conservatively.  Paroxysmal A. fib.  Hypertension  Obstructive sleep apnea left renal cyst, outpatient follow-up.  Nephrolithiasis.  Asymptomatic.        History of Present Illness   Triston Rausch is a 54 year old male with PMH significant for paroxysmal A-fib, HTN, SONG not using CPAP, kidney stones, and HLD who presents to the ED for evaluation of abdominal pain.    Hospital Course     Patient was admitted to internal medicine hospitalist service on behalf of general surgery.  General surgery consultation was obtained.    Patient was treated for partial bowel obstruction conservatively.  Initially we did try to place an NG tube but the patient did not tolerate that.    Patient was followed up by general surgery team.  Mainly, they managed the small bowel obstruction issue.  Patient was initially kept n.p.o. and slowly the diet was advanced.  Patient had slow but steady recovery.    No surgery was necessary.  It was treated conservatively.    Currently patient is tolerating the diet well without any issue.  He is on a regular diet.  Denies any abdominal pain, nausea or vomiting.  Having bowel motions.  He is looking forward to go home.    No changes in medications.    I personally evaluated and examined the patient on the day of discharge.    Lior Jenkins MD      Pending Results   These results will be followed up by PCP  Unresulted Labs Ordered in the Past 30 Days of this Admission     No orders found from 4/12/2019 to 6/12/2019.               Primary Care Physician   [facility]        Discharge Disposition   Discharged to home  Condition at discharge: Stable    Consultations This Hospital Stay   SURGERY GENERAL IP  CONSULT    Time Spent on this Encounter   Discharge time: greater than 30 minutes.    Discharge Orders      Follow-up and recommended labs and tests     Follow up with primary care provider, [facility], within 7 days for hospital follow- up.     Reason for your hospital stay    Small bowel obstruction     Activity    Your activity upon discharge: activity as tolerated     Diet    Follow this diet upon discharge: Orders Placed This Encounter      Advance Diet as Tolerated: Regular Diet Adult; Six Small Feedings Adult; Low Fiber     Discharge Medications   Current Discharge Medication List      CONTINUE these medications which have NOT CHANGED    Details   acetaminophen (TYLENOL) 500 MG tablet Take 500 mg by mouth every 6 hours as needed for mild pain      DILTIAZEM HCL PO Take 360 mg by mouth       losartan (COZAAR) 25 MG tablet Take 25 mg by mouth daily           Allergies   No Known Allergies  Data   Most Recent 3 CBC's:  Recent Labs   Lab Test 06/13/19  0635 06/11/19  0546 11/24/18  0942   WBC 4.0 8.4 6.9   HGB 12.3* 15.4 15.7   MCV 89 86 86    215 230      Most Recent 3 BMP's:  Recent Labs   Lab Test 06/16/19  0520 06/12/19  0946 06/11/19  0546    139 135   POTASSIUM 3.2* 3.5 3.4   CHLORIDE 105 107 103   CO2 29 27 26   BUN 4* 15 18   CR 0.75 0.87 1.12   ANIONGAP 7 5 6   CARIDAD 8.5 8.1* 8.2*   GLC 92 99 115*     Most Recent 2 LFT's:  Recent Labs   Lab Test 06/11/19  0546   AST 25   ALT 34   ALKPHOS 130   BILITOTAL 0.7     Most Recent INR's and Anticoagulation Dosing History:  Anticoagulation Dose History     Recent Dosing and Labs Latest Ref Rng & Units 9/5/2007 9/7/2007 9/8/2007    INR 0.86 - 1.14 1.08 1.04 1.05        Most Recent 3 Troponin's:  Recent Labs   Lab Test 11/24/18  0942 11/06/12  0820 06/09/12  0740   TROPI <0.015 <0.012 <0.012     Most Recent Cholesterol Panel:No lab results found.  Most Recent 6 Bacteria Isolates From Any Culture (See EPIC Reports for Culture  Details):  Recent Labs   Lab Test 09/10/17  0206   CULT No growth  No growth     Most Recent TSH, T4 and A1c Labs:  Recent Labs   Lab Test 11/24/18  0942   TSH 7.31*   T4 1.11

## 2019-06-16 NOTE — PLAN OF CARE
Transferred from [facility] at 0040  via W/C with all personal belongings. Said he was having bilateral flank pain rated 2 and described it as aching. Heating pad on. Hyperactive bowel sounds in upper quadrants.  Tolerated Broth. Said he had passed some gas. Said his last BM was 6/15/2019 at 4 PM. IV flushed easily. Encourage ambulation. Offer low fiber diet. AM lab draw. Offer Tylenol as needed.

## 2019-06-16 NOTE — PROGRESS NOTES
"[facility]  General Surgery Progress Note         Assessment and Plan:   Assessment:   SBO vs ileus - resolved per CT      Plan:   -Low-residue diet  -Hospitalist managing medically  -Disposition: From surgical standpoint can discharge today. Discharge instructions in chart. Recommend low-res diet for 1-2 weeks after discharge.         Interval History:   Sitting up in chair, doing very well. Tolerating low-res diet. Up walking, having BMs, voiding. He asked lots of questions about diet. Feels ready to go home now.         Physical Exam:   Blood pressure 129/73, pulse 64, temperature 97.6  F (36.4  C), temperature source Oral, resp. rate 18, height 1.905 m (6' 3\"), weight (!) 158.8 kg (350 lb), SpO2 95 %.    Abdomen:   Obese, soft, non-tender, hernia present at umbilicus but not tender             Data:   CT 6/15/19  IMPRESSION:  1. Hypoattenuating lesions likely represent cysts although the lesions  in the left kidney are slightly denser than expected for simple cysts.  Follow-up ultrasound or dynamic (MRI or CT) scanning of the kidneys is  recommended as clinically indicated.  2. Nephrolithiasis without evidence for urinary system obstruction.  3. Fat-containing umbilical and right inguinal hernias.  4. Mild nonaneurysmal atherosclerosis.  5. Mild fatty infiltration of the liver.  6. Etiology for patient's symptoms is otherwise not seen. No evidence  for bowel obstruction is identified.    Recent Labs   Lab 06/13/19  0635 06/11/19  0546   WBC 4.0 8.4   HGB 12.3* 15.4   HCT 37.6* 46.3   MCV 89 86    215     6/13 Abdominal films:    IMPRESSION: Gas-filled loops of distended small bowel significantly  decreased in size and number since yesterday's study. No evidence of  free air.       Kwabena Terry PA-C           "

## 2019-06-16 NOTE — PLAN OF CARE
Vital signs: Stable; B/P: 129/73, Temp: 97.6, HR: 64, RR: 18  Pain Control: Tylenol PRN  Diet: Tolerating low fiber diet without pain of nausea.  Output: Voiding adequately. Small stool x1.  Activity: Up independently in room.  Updates: BS active. Meeting discharge goals.  Plan: Adequate for discharge.    Adequate for discharge. Discharge teaching completed, questions and concerns answered and resources provided. Pt discharging home with Spouse.

## 2019-07-12 ENCOUNTER — HOSPITAL ENCOUNTER (EMERGENCY)
Facility: CLINIC | Age: 54
Discharge: HOME | End: 2019-07-12
Attending: EMERGENCY MEDICINE | Admitting: EMERGENCY MEDICINE
Payer: COMMERCIAL

## 2019-07-12 VITALS
RESPIRATION RATE: 16 BRPM | OXYGEN SATURATION: 95 % | SYSTOLIC BLOOD PRESSURE: 119 MMHG | DIASTOLIC BLOOD PRESSURE: 76 MMHG | HEART RATE: 70 BPM

## 2019-07-12 DIAGNOSIS — I48.0 PAROXYSMAL ATRIAL FIBRILLATION (H): ICD-10-CM

## 2019-07-12 PROCEDURE — 93005 ELECTROCARDIOGRAM TRACING: CPT

## 2019-07-12 PROCEDURE — 96374 THER/PROPH/DIAG INJ IV PUSH: CPT

## 2019-07-12 PROCEDURE — 27211117 ZZH CARDIOVERT/DEFIB/PACER SUPP

## 2019-07-12 PROCEDURE — 96375 TX/PRO/DX INJ NEW DRUG ADDON: CPT

## 2019-07-12 PROCEDURE — 25000128 H RX IP 250 OP 636: Performed by: EMERGENCY MEDICINE

## 2019-07-12 PROCEDURE — 92960 CARDIOVERSION ELECTRIC EXT: CPT

## 2019-07-12 PROCEDURE — 99285 EMERGENCY DEPT VISIT HI MDM: CPT | Mod: 25

## 2019-07-12 RX ORDER — PROPOFOL 10 MG/ML
200 INJECTION, EMULSION INTRAVENOUS ONCE
Status: COMPLETED | OUTPATIENT
Start: 2019-07-12 | End: 2019-07-12

## 2019-07-12 RX ORDER — FENTANYL CITRATE 50 UG/ML
75 INJECTION, SOLUTION INTRAMUSCULAR; INTRAVENOUS ONCE
Status: COMPLETED | OUTPATIENT
Start: 2019-07-12 | End: 2019-07-12

## 2019-07-12 RX ORDER — FENTANYL CITRATE 50 UG/ML
INJECTION, SOLUTION INTRAMUSCULAR; INTRAVENOUS
Status: DISCONTINUED
Start: 2019-07-12 | End: 2019-07-12 | Stop reason: HOSPADM

## 2019-07-12 RX ADMIN — PROPOFOL 200 MG: 10 INJECTION, EMULSION INTRAVENOUS at 19:10

## 2019-07-12 RX ADMIN — FENTANYL CITRATE 75 MCG: 50 INJECTION INTRAMUSCULAR; INTRAVENOUS at 19:07

## 2019-07-12 ASSESSMENT — ENCOUNTER SYMPTOMS
DIZZINESS: 0
FATIGUE: 1
FREQUENCY: 0
ACTIVITY CHANGE: 1
CONSTIPATION: 0
PALPITATIONS: 1
DYSURIA: 0
BLOOD IN STOOL: 0
DIARRHEA: 0

## 2019-07-12 NOTE — ED TRIAGE NOTES
Pt arrives with wife for evaluation of afib, which he states began around 1330 today.  Pt states he has extensive hx of transient afib/aflutter, which he's been cardioverted for.  ABCs in-tact. VSS.

## 2019-07-12 NOTE — ED PROVIDER NOTES
History     Chief Complaint:  Palpitations and Atrial Fibrillation     HPI   Triston Rausch is a 54 year old male with a history of palpations and paroxysmal atrial fibrillation who presents with palpitations and atrial fibrillation. The patient says that he started feeling his heart rate increase at 1330 today. The patient says that his last visit for the ED for this concern was back on 12/9/2018. The patient notes that attempts to cardiovert him via medications has been unsuccessful in the past and that the only successful way was through electrical shocks. The patient thinks that his current episode was caused by stress of today's events and denies any alcohol or caffeine binges. The patient says that he can feel when his heart starts going faster, he also says that he becomes tired and lethargic, and denies getting dizzy. The patient denies any blood in stool or any other bowel or bladder abnormalities. The patient notes that around 12 years ago he was started on blood thinners, but later taken off of them by his electrophysiologist at [facility].       Allergies:  No Known Drug Allergies     Medications:    Tylenol  Diltiazem  Cozaar    Past Medical History:    Atrial fibrillation/flutter  Chronic kidney disease  Hypertension  Small bowel obstruction  Obesity     Past Surgical History:    Genitourinary surgery   Hernia repair  Kidney stone removal    Family History:    Sister: heart disease    Social History:  The patient was accompanied to the ED by wife.  Smoking Status: Former Smoker  Smokeless Tobacco: Former User  Alcohol Use: Negative  Drug Use: Negative  [facility]   Marital Status:          Review of Systems   Constitutional: Positive for activity change and fatigue.   Cardiovascular: Positive for palpitations.   Gastrointestinal: Negative for blood in stool, constipation and diarrhea.   Genitourinary: Negative for dysuria and frequency.   Neurological:  Negative for dizziness.   All other systems reviewed and are negative.        Physical Exam     Patient Vitals for the past 24 hrs:   BP Pulse Heart Rate Resp SpO2   07/12/19 1950 119/76 70 72 16 95 %   07/12/19 1945 116/75 71 70 17 96 %   07/12/19 1940 117/77 71 71 16 96 %   07/12/19 1935 131/76 70 70 17 96 %   07/12/19 1930 124/80 72 75 17 97 %   07/12/19 1925 -- -- 75 -- 96 %   07/12/19 1920 128/77 76 75 22 96 %   07/12/19 1915 142/84 90 113 20 96 %   07/12/19 1910 172/90 112 90 20 97 %   07/12/19 1905 -- -- 91 17 95 %   07/12/19 1900 140/90 82 91 16 96 %   07/12/19 1850 155/88 89 95 17 95 %   07/12/19 1830 147/85 100 110 16 93 %   07/12/19 1815 148/87 106 102 16 95 %   07/12/19 1800 152/81 100 101 16 95 %   07/12/19 1745 (!) 138/100 102 102 16 94 %   07/12/19 1730 144/85 111 105 16 95 %   07/12/19 1715 (!) 182/110 115 108 16 96 %        Physical Exam  Gen: well appearing, in no acute distress  HEENT:  mmm, no rhinorrhea  Neck: supple, no abnormal swelling  Lungs:  CTAB,  no resp distress  CV: irreg irreg, no m/r/g, ppi  Abd: soft, nontender, nondistended, no rebound/masses/guarding/hsm  Ext: no peripheral edema  Skin: warm, dry, well perfused, no rashes/bruising/lesions on exposed skin  Neuro: alert, MAEE, no gross motor or sensory deficits, gait stable  Psych: Normal mood, normal affect      Emergency Department Course     ECG:  ECG taken at 1705, ECG read at 1711  Atrial fibrillation with rapid ventricular response  Incomplete right bundle branch block  Abnormal ECG  Rate 114 bpm. NE interval * ms. QRS duration 102 ms. QT/QTc 288/396 ms. P-R-T axes * 14 9.    ECG:  ECG taken at 1917, ECG read at 1919  Normal sinus rhythm  Incomplete right bundle branch block  Borderline ECG  NSR replaced and fib RVR  Rate 73 bpm. NE interval 182 ms. QRS duration 102 ms. QT/QTc 376/414 ms. P-R-T axes 33 10 15.     Procedures:  Electrical Cardioversion Procedure Note:         Indication:  Atrial Fibrilation        Consent:   Risks (including but not limited to: arrhythmia, stroke or death),  benefits and alternatives were discussed with patient and consent for procedure was obtained.      Timeout:  Universal protocol was followed.  TIME OUT conducted just    Prior to starting procedure confirmed patient identity, site/side, procedure    Patient position, and availability of correct equipment and implants:    Yes      Medication: Propofol (Diprivan)      Procedure note:  Electrodes were placed  in the anterior posterior position,   Trial 1:  Synchronized shock at  200 was successful      Patient Status:  Patient tolerated the procedure well.  There were no complications.     Interventions:   Sublimaze 75 mcg IV   Diprivan 200 mg IV    Emergency Department Course:     Nursing notes and vitals reviewed.     I performed an exam of the patient as documented above.      I performed the electrical cardioversion  procedure as documented above.      Prior to discharge, I personally answered all related questions.     Impression & Plan      Medical Decision Makin 4-year-old male with a history of paroxysmal atrial fibrillation and previous ablation presenting with acute onset of palpitations found to be in atrial fibrillation.  No significant complicating symptoms of lightheadedness chest pain shortness of breath.  Has been cardioverted before and prefer that therapy compared to medication.  We discussed the procedure in detail and elected to go ahead with the procedure and had one shock success restoring sinus rhythm.  He has a chads 2 VASC score of 1.  We discussed what this means and asked him to follow-up with his electrophysiologist to talk about aspirin versus a stronger anticoagulation.  He was comfortable and agreeable with that plan.  Basic blood test showed no clear underlying provoking etiology.  Very low suspicion this was triggered by ischemia.    Diagnosis:    ICD-10-CM    1. Paroxysmal atrial  fibrillation (H) I48.0      Disposition:   The patient is discharged to home.     Discharge Medications:  No discharge medications.      Scribe Disclosure:  NERY, Ricardo Mantilla, am serving as a scribe at 5:18 PM on 7/12/2019 to document services personally performed by Chintan Pearson MD based on my observations and the provider's statements to me.      [facility] EMERGENCY DEPARTMENT       Chintan Pearson MD  07/13/19 0114

## 2019-07-12 NOTE — ED AVS SNAPSHOT
[facility] Emergency Department  [address]  [facility] [address]  Phone:  [phone]  Fax:  [phone]                                    Triston Rausch   MRN: [id number]    Department:  [facility] Emergency Department   Date of Visit:  7/12/2019           After Visit Summary Signature Page    I have received my discharge instructions, and my questions have been answered. I have discussed any challenges I see with this plan with the nurse or doctor.    ..........................................................................................................................................  Patient/Patient Representative Signature      ..........................................................................................................................................  Patient Representative Print Name and Relationship to Patient    ..................................................               ................................................  Date                                   Time    ..........................................................................................................................................  Reviewed by Signature/Title    ...................................................              ..............................................  Date                                               Time          22EPIC Rev 08/18

## 2019-07-13 NOTE — ED NOTES
Stayed with patient post-procedure.  Vitally stable, good respirations and oxygen sats.  Conversing, A&Ox4.

## 2019-07-15 LAB
INTERPRETATION ECG - MUSE: NORMAL
INTERPRETATION ECG - MUSE: NORMAL

## 2023-07-27 ENCOUNTER — HOSPITAL ENCOUNTER (EMERGENCY)
Facility: CLINIC | Age: 58
Discharge: HOME | End: 2023-07-28
Attending: EMERGENCY MEDICINE | Admitting: EMERGENCY MEDICINE
Payer: COMMERCIAL

## 2023-07-27 DIAGNOSIS — K64.5 THROMBOSED EXTERNAL HEMORRHOIDS: ICD-10-CM

## 2023-07-27 DIAGNOSIS — K64.4 EXTERNAL HEMORRHOID, BLEEDING: ICD-10-CM

## 2023-07-27 LAB
ABO/RH(D): NORMAL
ALBUMIN SERPL BCG-MCNC: 4.1 G/DL (ref 3.5–5.2)
ALP SERPL-CCNC: 120 U/L (ref 40–129)
ALT SERPL W P-5'-P-CCNC: 22 U/L (ref 0–70)
ANION GAP SERPL CALCULATED.3IONS-SCNC: 11 MMOL/L (ref 7–15)
ANTIBODY SCREEN: NEGATIVE
APTT PPP: 28 SECONDS (ref 22–38)
AST SERPL W P-5'-P-CCNC: 23 U/L (ref 0–45)
BASOPHILS # BLD AUTO: 0 10E3/UL (ref 0–0.2)
BASOPHILS NFR BLD AUTO: 0 %
BILIRUB SERPL-MCNC: 0.6 MG/DL
BUN SERPL-MCNC: 17.1 MG/DL (ref 6–20)
CALCIUM SERPL-MCNC: 9.4 MG/DL (ref 8.6–10)
CHLORIDE SERPL-SCNC: 101 MMOL/L (ref 98–107)
CREAT SERPL-MCNC: 0.84 MG/DL (ref 0.67–1.17)
DEPRECATED HCO3 PLAS-SCNC: 25 MMOL/L (ref 22–29)
EOSINOPHIL # BLD AUTO: 0.2 10E3/UL (ref 0–0.7)
EOSINOPHIL NFR BLD AUTO: 3 %
ERYTHROCYTE [DISTWIDTH] IN BLOOD BY AUTOMATED COUNT: 13.5 % (ref 10–15)
GFR SERPL CREATININE-BSD FRML MDRD: >90 ML/MIN/1.73M2
GLUCOSE SERPL-MCNC: 116 MG/DL (ref 70–99)
HCT VFR BLD AUTO: 41.1 % (ref 40–53)
HGB BLD-MCNC: 14 G/DL (ref 13.3–17.7)
HOLD SPECIMEN: NORMAL
IMM GRANULOCYTES # BLD: 0 10E3/UL
IMM GRANULOCYTES NFR BLD: 0 %
INR PPP: 0.96 (ref 0.85–1.15)
LYMPHOCYTES # BLD AUTO: 1.7 10E3/UL (ref 0.8–5.3)
LYMPHOCYTES NFR BLD AUTO: 23 %
MCH RBC QN AUTO: 30.2 PG (ref 26.5–33)
MCHC RBC AUTO-ENTMCNC: 34.1 G/DL (ref 31.5–36.5)
MCV RBC AUTO: 89 FL (ref 78–100)
MONOCYTES # BLD AUTO: 0.6 10E3/UL (ref 0–1.3)
MONOCYTES NFR BLD AUTO: 9 %
NEUTROPHILS # BLD AUTO: 4.6 10E3/UL (ref 1.6–8.3)
NEUTROPHILS NFR BLD AUTO: 65 %
NRBC # BLD AUTO: 0 10E3/UL
NRBC BLD AUTO-RTO: 0 /100
PLATELET # BLD AUTO: 258 10E3/UL (ref 150–450)
POTASSIUM SERPL-SCNC: 4.1 MMOL/L (ref 3.4–5.3)
PROT SERPL-MCNC: 8.1 G/DL (ref 6.4–8.3)
RBC # BLD AUTO: 4.64 10E6/UL (ref 4.4–5.9)
SODIUM SERPL-SCNC: 137 MMOL/L (ref 136–145)
SPECIMEN EXPIRATION DATE: NORMAL
WBC # BLD AUTO: 7.2 10E3/UL (ref 4–11)

## 2023-07-27 PROCEDURE — 86850 RBC ANTIBODY SCREEN: CPT | Performed by: EMERGENCY MEDICINE

## 2023-07-27 PROCEDURE — 80053 COMPREHEN METABOLIC PANEL: CPT | Performed by: EMERGENCY MEDICINE

## 2023-07-27 PROCEDURE — 36415 COLL VENOUS BLD VENIPUNCTURE: CPT | Performed by: EMERGENCY MEDICINE

## 2023-07-27 PROCEDURE — 86901 BLOOD TYPING SEROLOGIC RH(D): CPT | Performed by: EMERGENCY MEDICINE

## 2023-07-27 PROCEDURE — 85610 PROTHROMBIN TIME: CPT | Performed by: EMERGENCY MEDICINE

## 2023-07-27 PROCEDURE — 85025 COMPLETE CBC W/AUTO DIFF WBC: CPT | Performed by: EMERGENCY MEDICINE

## 2023-07-27 PROCEDURE — 46083 INC THROMBOSED HROID XTRNL: CPT

## 2023-07-27 PROCEDURE — 99284 EMERGENCY DEPT VISIT MOD MDM: CPT | Mod: 25

## 2023-07-27 PROCEDURE — 250N000013 HC RX MED GY IP 250 OP 250 PS 637: Performed by: EMERGENCY MEDICINE

## 2023-07-27 PROCEDURE — 85730 THROMBOPLASTIN TIME PARTIAL: CPT | Performed by: EMERGENCY MEDICINE

## 2023-07-27 RX ADMIN — COCOA BUTTER, PHENYLEPHRINE HYDROCHLORIDE 1 SUPPOSITORY: 2211; 6.25 SUPPOSITORY RECTAL at 22:05

## 2023-07-27 ASSESSMENT — ACTIVITIES OF DAILY LIVING (ADL)
ADLS_ACUITY_SCORE: 35
ADLS_ACUITY_SCORE: 35

## 2023-07-28 VITALS
SYSTOLIC BLOOD PRESSURE: 132 MMHG | OXYGEN SATURATION: 99 % | HEART RATE: 76 BPM | RESPIRATION RATE: 18 BRPM | DIASTOLIC BLOOD PRESSURE: 68 MMHG | TEMPERATURE: 98.1 F

## 2023-07-28 PROBLEM — K64.5 THROMBOSED EXTERNAL HEMORRHOIDS: Status: ACTIVE | Noted: 2023-07-28

## 2023-07-28 PROBLEM — K64.4 EXTERNAL HEMORRHOID, BLEEDING: Status: ACTIVE | Noted: 2023-07-28

## 2023-07-28 RX ORDER — POLYETHYLENE GLYCOL 3350 17 G/17G
POWDER, FOR SOLUTION ORAL
Qty: 527 G | Refills: 0 | Status: SHIPPED | OUTPATIENT
Start: 2023-07-28

## 2023-07-28 RX ORDER — STANNOUS FLUORIDE 1.4 MG/G
2-4 PASTE, DENTIFRICE DENTAL 2 TIMES DAILY
Qty: 414 G | Refills: 0 | Status: SHIPPED | OUTPATIENT
Start: 2023-07-28

## 2023-07-28 RX ORDER — LIDOCAINE HYDROCHLORIDE AND EPINEPHRINE 10; 10 MG/ML; UG/ML
INJECTION, SOLUTION INFILTRATION; PERINEURAL
Status: DISCONTINUED
Start: 2023-07-28 | End: 2023-07-28 | Stop reason: HOSPADM

## 2023-07-28 NOTE — ED TRIAGE NOTES
Patient reports rectal bleeding that he noted 1/2 hour ago. Patient feels it is from a hemorrhoid .  Patient reports the blood to be dark in color.      Triage Assessment       Row Name 07/27/23 2005       Triage Assessment (Adult)    Airway WDL WDL       Respiratory WDL    Respiratory WDL WDL       Skin Circulation/Temperature WDL    Skin Circulation/Temperature WDL WDL       Cardiac WDL    Cardiac WDL WDL       Peripheral/Neurovascular WDL    Peripheral Neurovascular WDL WDL       Cognitive/Neuro/Behavioral WDL    Cognitive/Neuro/Behavioral WDL WDL

## 2023-07-28 NOTE — ED PROVIDER NOTES
History   Chief Complaint:  Rectal Bleeding    The history is provided by the patient.      Triston Rausch is a 58 year old male who presents to the ED for evaluation of rectal bleeding. The patient reports that he has a known external hemorrhoid, and this evening around 1930 had new rectal bleeding. 4 days ago is when he noticed the hemorrhoid bulging out with pain. He notes that he had a kidney stone removed on 7/21/23 and currently has a ureteral stent in place. He denies any abdominal pain, fever, dizziness, light headedness, or bleeding elsewhere. He has not been having a bowel movement for the last 2-3 days due to the pain. He has been off of his Xarelto since 7/17/23 related to the urologic procedures and has not yet restarted this. He has never had a colonoscopy or seen anyone for hemorrhoids. He states that it never entirely goes away, but it has never been this large, painful or bleeding. He denies any trauma to the area. He has been using OTC Preparation H cream without relief.    Independent Historian:   None - Patient Only    Review of External Notes:  none    ROS:  See HPI    Allergies:  Lisinopril     Physical Exam   Patient Vitals for the past 24 hrs:   BP Temp Pulse Resp SpO2   07/28/23 0032 132/68 -- 76 18 99 %   07/27/23 2300 -- -- 70 -- 98 %   07/27/23 2123 (!) 141/60 -- 67 -- 97 %   07/27/23 2007 (!) 211/94 98.1  F (36.7  C) 72 18 96 %     Physical Exam  General: Well appearing, nontoxic.  Resting comfortably  Head:  Scalp, face, and head appear normal  Eyes:  Pupils are equal, round    Conjunctivae non-injected and sclerae white  ENT:    The external nose is normal    Pinnae are normal  Neck:  Normal range of motion    There is no rigidity noted    Trachea is in the midline  CV:  Regular rate and rhythm     Normal S1/S2, no S3/S4    No murmur or rub. Radial pulses 2+ bilaterally.  Resp:  Lungs are clear and equal bilaterally  There is no tachypnea    No increased work of breathing    No  rales, wheezing, or rhonchi  GI:  Abdomen is soft, no rigidity or guarding    No distension, or mass    No tenderness or rebound tenderness  Rectal Exam: Multiple large external hemorrhoids. Acutely thrombosed anterior left sided painful hemorrhoid measuring 5cm in diameter with ulceration of the overlying skin and mild dark venous oozing bleeding from the area of ulceration. Hemorrhoids extend into the anal canal. No large internal hemorrhoids palpated. Rectal tone normal.    MS:  Normal muscular tone  Skin:  No rash or acute skin lesions noted  Neuro:  Awake and alert  Speech is normal and fluent  Moves all extremities spontaneously  Psych: Normal affect. Appropriate interactions.     Emergency Department Course     Laboratory:  Labs Ordered and Resulted from Time of ED Arrival to Time of ED Departure   COMPREHENSIVE METABOLIC PANEL - Abnormal       Result Value    Sodium 137      Potassium 4.1      Chloride 101      Carbon Dioxide (CO2) 25      Anion Gap 11      Urea Nitrogen 17.1      Creatinine 0.84      Calcium 9.4      Glucose 116 (*)     Alkaline Phosphatase 120      AST 23      ALT 22      Protein Total 8.1      Albumin 4.1      Bilirubin Total 0.6      GFR Estimate >90     INR - Normal    INR 0.96     PARTIAL THROMBOPLASTIN TIME - Normal    aPTT 28     CBC WITH PLATELETS AND DIFFERENTIAL    WBC Count 7.2      RBC Count 4.64      Hemoglobin 14.0      Hematocrit 41.1      MCV 89      MCH 30.2      MCHC 34.1      RDW 13.5      Platelet Count 258      % Neutrophils 65      % Lymphocytes 23      % Monocytes 9      % Eosinophils 3      % Basophils 0      % Immature Granulocytes 0      NRBCs per 100 WBC 0      Absolute Neutrophils 4.6      Absolute Lymphocytes 1.7      Absolute Monocytes 0.6      Absolute Eosinophils 0.2      Absolute Basophils 0.0      Absolute Immature Granulocytes 0.0      Absolute NRBCs 0.0     TYPE AND SCREEN, ADULT    ABO/RH(D) A POS      Antibody Screen Negative      SPECIMEN EXPIRATION  DATE [id number]     ABO/RH TYPE AND SCREEN      [facility]    PROCEDURE: -Incision/Drainage    Date/Time: 7/27/2023 11:51 PM    Performed by: Jean-Claude Saenz MD  Authorized by: Jean-Claude Saenz MD    Risks, benefits and alternatives discussed.      LOCATION:      Type:  External thrombosed hemorrhoid    Size:  5cm    Location:  Anogenital    Anogenital location:  Perianal    PRE-PROCEDURE DETAILS:     Skin preparation:  Betadine    PROCEDURE TYPE:     Complexity:  Simple    ANESTHESIA (see MAR for exact dosages):     Anesthesia method:  Topical application    Topical anesthetic:  LET    PROCEDURE DETAILS:     Needle aspiration: no      Incision type: existing skin ulceration overlying the external hemorrhoid was bluntly extended slightly with a cotton tipped applicator.    Wound management:  Irrigated with saline and probed and deloculated    Drainage:  Bloody (3cm clot expressed and removed)    Wound treatment:  Wound left open    Packing materials:  None    PROCEDURE  Describe Procedure: After the thrombosed clot was removed from the hemorrhoid the area was locally infiltrated with 1% lidocaine with epinephrine.   Patient Tolerance:  Patient tolerated the procedure well with no immediate complications       Emergency Department Course & Assessments:  Interventions:  Medications   phenylephrine-cocoa butter (PREPARATION H) per suppository 1 suppository (1 suppository Rectal $Given 7/27/23 2204)      Assessments, Independent Interpretation, Consult/Discussion of ManagementTests:  ED Course as of 07/28/23 1322   Thu Jul 27, 2023 2112 I obtained history and examined the patient as reported above.   2351 Patient rechecked by myself     Social Determinants of Health affecting care:  None    Disposition:  The patient was discharged to home.     Impression & Plan    Medical Decision Making:  Triston Rausch is a 58 year old male who presents to the ED for evaluation of large bleeding  thrombosed external hemorrhoid. On my evaluation the patient is well appearing and nontoxic. Hemodynamically stable. Rectal exam shows a large acutely thrombosed external hemorrhoid with ulceration of the overlying skin and oozing bleeding.  As noted above the clot was able to be removed from this resulting in a significant decrease in its size.  Lidocaine with epinephrine was then injected around the base of the hemorrhoid.  Patient to continue phenylephrine suppositories and Preparation H creams with sitz bath's.  Given the multiple large external hemorrhoids patient would benefit from colorectal surgery evaluation.  I recommended patient continue holding his anticoagulation until the hemorrhoid has healed unless he is instructed to restart it sooner by his urologist or PCP.  Bleeding is controlled in the emergency department.  Referral for colorectal surgery was placed.  Patient also to start fiber supplement and MiraLAX to help with passing bowel movements.  I recommended he avoid using dry toilet paper to clean after bowel movements and should instead gently wipe with a wet wipe and then finish cleaning with a sitz bath or shower.  Patient is agreeable to plan of care.  Close return precautions are provided he was discharged in stable and improved condition.    Diagnosis:    ICD-10-CM    1. Thrombosed external hemorrhoids  K64.5 Adult Colorectal Surgery  Referral      2. External hemorrhoid, bleeding  K64.4 Adult Colorectal Surgery  Referral           Discharge Medications:  Discharge Medication List as of 7/28/2023 12:23 AM        START taking these medications    Details   phenylephrine-cocoa butter (PREPARATION H) 0.25-88.44 % suppository Place 1 suppository rectally 3 times daily as needed for hemorrhoids, Disp-24 suppository, R-0, E-Prescribe      polyethylene glycol (MIRALAX) 17 GM/Dose powder Mix 1 capful with 6-8 ounces of clear liquid and take by mouth twice daily as needed for  constipation. Decrease dose to once daily or once every 2-3 days to prevent constipation., Disp-527 g, R-0, E-Prescribe      pramox-pe-glycerin-petrolatum (PREPARATION H) 1-0.25-14.4-15 % CREA cream Cleanse the affected area by patting or blotting with an appropriate cleansing wipe. Gently dry by patting or blotting with a tissue or a soft cloth before applying cream. Apply externally or in the lower portion of the anal canal only. Apply externally  to the affected area up to 4 times daily, especially at night, in the morning or after each bowel movementDisp-50 g, [id number]      Psyllium (METAMUCIL PREMIUM BLEND) 52.63 % POWD Take 2-4 teaspoonful by mouth 2 times daily Follow package instructions for mixing with liquid., Disp-414 g, R-0, E-Prescribe           7/27/2023   Jean-Claude Saenz MD     Historical Data:  ______________________________________________________________________  Medications:    Xarelto   Tiazac   Tambocor   Cozaar   Hygroton   Terry     Past Medical History:   Past Surgical History:     Past Medical History:   Diagnosis Date    Atrial fib/flutter, transient     Chronic kidney disease     Hypertension     Past Surgical History:   Procedure Laterality Date    GENITOURINARY SURGERY        Patient Active Problem List    Diagnosis Date Noted    Thrombosed external hemorrhoids 07/28/2023     Priority: Medium    External hemorrhoid, bleeding 07/28/2023     Priority: Medium    SBO (small bowel obstruction) (H) 06/11/2019     Priority: Medium          Family History:    family history is not on file. Social History:   reports that he has never smoked. He has never used smokeless tobacco. He reports that he does not drink alcohol and does not use drugs.     PCP: [facility], [facility] [place] I, Rafiq Aggarwal, am serving as a scribe to document services based on my observations and the provider's statements to me.       Jean-Claude Saenz MD  07/28/23 8202

## 2023-07-28 NOTE — TELEPHONE ENCOUNTER
Diagnosis, Referred by & from: F/U Thrombosed Hemorrhoid   Appt date: 8/4/2023   NOTES STATUS DETAILS   OFFICE NOTE from referring provider N/A    OFFICE NOTE from other specialist [facility]:  5/5/23, 4/24/23 - PCC OV with Dr. Nj  5/5/23 - URO OV with Dr. Link  4/25/23 - GEN SURG OV with Dr. Pettit  4/17/23 - UC OV with Dr. Garner   DISCHARGE SUMMARY from hospital [facility]:  6/11/19 - Admission with Dr. Yung   DISCHARGE REPORT from the ER [facility]:  7/27/23 - ED OV with Dr. Saenz   OPERATIVE REPORT [facility]:  7/21/23 - OP Note for CYSTOSCOPY, PYELOGRAM, URETEROSCOPY, URETERAL STENT PLACEMENT with Dr. Link    [facility]:  4/13/23 - OP Note for UMBILICAL HERNIA REPAIR with Dr Pettit   MEDICATION LIST Internal    LABS N/A    DIAGNOSTIC PROCEDURES N/A    IMAGING (DISC & REPORT)      CT Received / Internal [facility]:  4/17/23 - CT Abd/Pelvis    [facility]:  6/15/19 - CT Abd/Pelvis  6/11/19 - CT Abd/Pelvis   MRI Received [facility]:  5/15/23 - MRI Abdomen   XRAY Internal [facility]:  6/15/19 - XR Abdomen  6/13/19 - XR Abdomen  6/12/19 - XR Abdomen     Records Requested  07/28/23    Facility  [facility]  Fax: [phone]   Outcome * 7/28/23 10:28 AM Faxed urg req to  for images to be pushed to [facility]. - Jennifer    * 7/31/23 3:50 PM Images received from [facility] and attached to the patient in PACs. - Jennifer

## 2023-08-04 ENCOUNTER — PRE VISIT (OUTPATIENT)
Dept: SURGERY | Facility: CLINIC | Age: 58
End: 2023-08-04

## 2023-08-04 ENCOUNTER — OFFICE VISIT (OUTPATIENT)
Dept: SURGERY | Facility: CLINIC | Age: 58
End: 2023-08-04
Payer: COMMERCIAL

## 2023-08-04 VITALS — DIASTOLIC BLOOD PRESSURE: 79 MMHG | HEART RATE: 64 BPM | OXYGEN SATURATION: 97 % | SYSTOLIC BLOOD PRESSURE: 165 MMHG

## 2023-08-04 DIAGNOSIS — K64.5 THROMBOSED EXTERNAL HEMORRHOIDS: Primary | ICD-10-CM

## 2023-08-04 PROCEDURE — 99202 OFFICE O/P NEW SF 15 MIN: CPT

## 2023-08-04 RX ORDER — FLECAINIDE ACETATE 100 MG/1
100 TABLET ORAL
COMMUNITY
Start: 2022-12-12

## 2023-08-04 ASSESSMENT — PAIN SCALES - GENERAL: PAINLEVEL: MILD PAIN (2)

## 2023-08-04 NOTE — NURSING NOTE
Chief Complaint   Patient presents with    Consult       Vitals:    08/04/23 1253   BP: (!) 165/79   BP Location: Left arm   Patient Position: Sitting   Cuff Size: Adult Large   Pulse: 64   SpO2: 97%       There is no height or weight on file to calculate BMI.    Elmo Moore EMT-P

## 2023-08-04 NOTE — PROGRESS NOTES
Colon and Rectal Surgery Consult Clinic Note    Date: 2023     Referring provider:  Jean-Claude Saenz MD  [facility]  [address] [place] [address]     RE: Triston Rausch  : 1965  DORCAS: 2023    Triston Rausch is a very pleasant 58 year old male with thrombosed external hemorrhoids s/p I&D in the ED on 23.     Triston reports a chronic external hemorrhoid that is not usually painful or bleeds. He reports a significant flare about 2 weeks ago and presented to the ED on . He had an I&D of a thrombosed external hemorrhoid. A 3 cm clot was evacuated. He reports gradual improvement of pain over the next week. It is currently still mildly tender. There is a small amount of blood as well. Bowel movements previously were 3-4 times a week. Since the ED visit, he has been taking a fiber supplement so now having daily soft bowel movements.    He is on chronic Xarelto but has been off since 23 for procedures related to a kidney stone. He should be back on it but is concerned about some ongoing rectal bleeding from the hemorrhoid.     No prior colonoscopy. He is aware that he is overdue for screening and has just been putting it off. No family history of colorectal cancer.     Physical Examination: Exam was chaperoned by Elmo Moore, EMT-P   BP (!) 165/79 (BP Location: Left arm, Patient Position: Sitting, Cuff Size: Adult Large)   Pulse 64   SpO2 97%   General: alert, oriented, in no acute distress, sitting comfortably  HEENT: moist mucous membranes  Perianal external examination:  Large external hemorrhoids on left lateral and right lateral. The right side is larger and there are two ulcerations, presumably from clot erosions. The hemorrhoids are all soft and minimally tender. No active bleeding.    Digital rectal examination: Was deferred    Anoscopy: Was deferred    Assessment/Plan: Triston Rausch is a 58 year old male with resolving thrombosed external hemorrhoids.  Discussed that full resolution may take up to 6 weeks. No need for further intervention at this time. He is established with the [facility] system and would prefer to continue following up there. This is reasonable. I recommend continuing the fiber supplement indefinitely. He is okay from a colorectal perspective to restart his anticoagulation. He should also reach out to the provider who manages his anticoagulation. Patient's questions were answered to his stated satisfaction and he is in agreement with this plan.     Medical history:  Past Medical History:   Diagnosis Date    Atrial fib/flutter, transient     Chronic kidney disease     Hypertension        Surgical history:  Past Surgical History:   Procedure Laterality Date    GENITOURINARY SURGERY         Problem list:    Patient Active Problem List    Diagnosis Date Noted    Thrombosed external hemorrhoids 07/28/2023     Priority: Medium    External hemorrhoid, bleeding 07/28/2023     Priority: Medium    SBO (small bowel obstruction) (H) 06/11/2019     Priority: Medium       Medications:  Current Outpatient Medications   Medication Sig Dispense Refill    acetaminophen (TYLENOL) 500 MG tablet Take 500 mg by mouth every 6 hours as needed for mild pain      DILTIAZEM HCL PO Take 360 mg by mouth       flecainide (TAMBOCOR) 100 MG tablet Take 100 mg by mouth      losartan (COZAAR) 25 MG tablet Take 25 mg by mouth daily      phenylephrine-cocoa butter (PREPARATION H) 0.25-88.44 % suppository Place 1 suppository rectally 3 times daily as needed for hemorrhoids 24 suppository 0    Psyllium (METAMUCIL PREMIUM BLEND) 52.63 % POWD Take 2-4 teaspoonful by mouth 2 times daily Follow package instructions for mixing with liquid. 414 g 0    polyethylene glycol (MIRALAX) 17 GM/Dose powder Mix 1 capful with 6-8 ounces of clear liquid and take by mouth twice daily as needed for constipation. Decrease dose to once daily or once every 2-3 days to prevent constipation. (Patient not  taking: Reported on 8/4/2023) 527 g 0    pramox-pe-glycerin-petrolatum (PREPARATION H) 1-0.25-14.4-15 % CREA cream Cleanse the affected area by patting or blotting with an appropriate cleansing wipe. Gently dry by patting or blotting with a tissue or a soft cloth before applying cream. Apply externally or in the lower portion of the anal canal only. Apply externally to the affected area up to 4 times daily, especially at night, in the morning or after each bowel movement (Patient not taking: Reported on 8/4/2023) 50 g 1       Allergies:  No Known Allergies    Family history:  No family history on file.    Social history:  Social History     Tobacco Use    Smoking status: Never    Smokeless tobacco: Never   Substance Use Topics    Alcohol use: No    Marital status: .    Nursing Notes:   Elmo Moore EMT  8/4/2023 12:57 PM  Signed  Chief Complaint   Patient presents with    Consult       Vitals:    08/04/23 1253   BP: (!) 165/79   BP Location: Left arm   Patient Position: Sitting   Cuff Size: Adult Large   Pulse: 64   SpO2: 97%       There is no height or weight on file to calculate BMI.    Elmo Moore EMT-P       20 minutes spent on the date of encounter performing chart review, history and exam, documentation and further activities as noted above.    ----------------------------------------------------  Nelida Thompson PA-C  Colon and Rectal Surgery   [facility]

## 2023-08-04 NOTE — PATIENT INSTRUCTIONS
Reach out to your primary care provider to get a referral for a colonoscopy.    Pain and bleeding should continue to improve over the next few weeks.     Continue the fiber supplement.    Follow up as needed.

## 2023-08-04 NOTE — LETTER
2023       RE: Triston Rausch  [address]  [facility] [id number]       Dear Colleague,    Thank you for referring your patient, Triston Rausch, to the [facility] [place] [facility]. Please see a copy of my visit note below.    Colon and Rectal Surgery Consult Clinic Note    Date: 2023     Referring provider:  Jean-Claude Saenz MD  [facility]   [address] [place] [address]     RE: Triston Rausch  : 1965  DORCAS: 2023    Triston Rausch is a very pleasant 58 year old male with thrombosed external hemorrhoids s/p I&D in the ED on 23.     Triston reports a chronic external hemorrhoid that is not usually painful or bleeds. He reports a significant flare about 2 weeks ago and presented to the ED on . He had an I&D of a thrombosed external hemorrhoid. A 3 cm clot was evacuated. He reports gradual improvement of pain over the next week. It is currently still mildly tender. There is a small amount of blood as well. Bowel movements previously were 3-4 times a week. Since the ED visit, he has been taking a fiber supplement so now having daily soft bowel movements.    He is on chronic Xarelto but has been off since 23 for procedures related to a kidney stone. He should be back on it but is concerned about some ongoing rectal bleeding from the hemorrhoid.     No prior colonoscopy. He is aware that he is overdue for screening and has just been putting it off. No family history of colorectal cancer.     Physical Examination: Exam was chaperoned by Elmo Moore, EMT-P   BP (!) 165/79 (BP Location: Left arm, Patient Position: Sitting, Cuff Size: Adult Large)   Pulse 64   SpO2 97%   General: alert, oriented, in no acute distress, sitting comfortably  HEENT: moist mucous membranes  Perianal external examination:  Large external hemorrhoids on left lateral and right  lateral. The right side is larger and there are two ulcerations, presumably from clot erosions. The hemorrhoids are all soft and minimally tender. No active bleeding.    Digital rectal examination: Was deferred    Anoscopy: Was deferred    Assessment/Plan: Triston Rausch is a 58 year old male with resolving thrombosed external hemorrhoids. Discussed that full resolution may take up to 6 weeks. No need for further intervention at this time. He is established with the [facility] system and would prefer to continue following up there. This is reasonable. I recommend continuing the fiber supplement indefinitely. He is okay from a colorectal perspective to restart his anticoagulation. He should also reach out to the provider who manages his anticoagulation. Patient's questions were answered to his stated satisfaction and he is in agreement with this plan.     Medical history:  Past Medical History:   Diagnosis Date    Atrial fib/flutter, transient     Chronic kidney disease     Hypertension        Surgical history:  Past Surgical History:   Procedure Laterality Date    GENITOURINARY SURGERY         Problem list:    Patient Active Problem List    Diagnosis Date Noted    Thrombosed external hemorrhoids 07/28/2023     Priority: Medium    External hemorrhoid, bleeding 07/28/2023     Priority: Medium    SBO (small bowel obstruction) (H) 06/11/2019     Priority: Medium       Medications:  Current Outpatient Medications   Medication Sig Dispense Refill    acetaminophen (TYLENOL) 500 MG tablet Take 500 mg by mouth every 6 hours as needed for mild pain      DILTIAZEM HCL PO Take 360 mg by mouth       flecainide (TAMBOCOR) 100 MG tablet Take 100 mg by mouth      losartan (COZAAR) 25 MG tablet Take 25 mg by mouth daily      phenylephrine-cocoa butter (PREPARATION H) 0.25-88.44 % suppository Place 1 suppository rectally 3 times daily as needed for hemorrhoids 24 suppository 0    Psyllium (METAMUCIL PREMIUM BLEND) 52.63 % POWD  Take 2-4 teaspoonful by mouth 2 times daily Follow package instructions for mixing with liquid. 414 g 0    polyethylene glycol (MIRALAX) 17 GM/Dose powder Mix 1 capful with 6-8 ounces of clear liquid and take by mouth twice daily as needed for constipation. Decrease dose to once daily or once every 2-3 days to prevent constipation. (Patient not taking: Reported on 8/4/2023) 527 g 0    pramox-pe-glycerin-petrolatum (PREPARATION H) 1-0.25-14.4-15 % CREA cream Cleanse the affected area by patting or blotting with an appropriate cleansing wipe. Gently dry by patting or blotting with a tissue or a soft cloth before applying cream. Apply externally or in the lower portion of the anal canal only. Apply externally to the affected area up to 4 times daily, especially at night, in the morning or after each bowel movement (Patient not taking: Reported on 8/4/2023) 50 g 1       Allergies:  No Known Allergies    Family history:  No family history on file.    Social history:  Social History     Tobacco Use    Smoking status: Never    Smokeless tobacco: Never   Substance Use Topics    Alcohol use: No    Marital status: .    Nursing Notes:   Elmo Moore, EMT  8/4/2023 12:57 PM  Signed  Chief Complaint   Patient presents with    Consult       Vitals:    08/04/23 1253   BP: (!) 165/79   BP Location: Left arm   Patient Position: Sitting   Cuff Size: Adult Large   Pulse: 64   SpO2: 97%       There is no height or weight on file to calculate BMI.    Elmo Moore EMT-P    20 minutes spent on the date of encounter performing chart review, history and exam, documentation and further activities as noted above.          Again, thank you for allowing me to participate in the care of your patient.      Sincerely,    Nelida Thompson PA-C